# Patient Record
Sex: FEMALE | Race: WHITE
[De-identification: names, ages, dates, MRNs, and addresses within clinical notes are randomized per-mention and may not be internally consistent; named-entity substitution may affect disease eponyms.]

---

## 2024-05-31 ENCOUNTER — NON-APPOINTMENT (OUTPATIENT)
Age: 89
End: 2024-05-31

## 2024-06-14 ENCOUNTER — NON-APPOINTMENT (OUTPATIENT)
Age: 89
End: 2024-06-14

## 2024-06-14 ENCOUNTER — APPOINTMENT (OUTPATIENT)
Dept: ORTHOPEDIC SURGERY | Facility: CLINIC | Age: 89
End: 2024-06-14
Payer: MEDICARE

## 2024-06-14 VITALS — HEIGHT: 61 IN | BODY MASS INDEX: 29.07 KG/M2 | WEIGHT: 154 LBS

## 2024-06-14 DIAGNOSIS — Z86.39 PERSONAL HISTORY OF OTHER ENDOCRINE, NUTRITIONAL AND METABOLIC DISEASE: ICD-10-CM

## 2024-06-14 DIAGNOSIS — Z78.9 OTHER SPECIFIED HEALTH STATUS: ICD-10-CM

## 2024-06-14 DIAGNOSIS — M51.26 OTHER INTERVERTEBRAL DISC DISPLACEMENT, LUMBAR REGION: ICD-10-CM

## 2024-06-14 DIAGNOSIS — M43.16 SPONDYLOLISTHESIS, LUMBAR REGION: ICD-10-CM

## 2024-06-14 DIAGNOSIS — M54.50 LOW BACK PAIN, UNSPECIFIED: ICD-10-CM

## 2024-06-14 DIAGNOSIS — Z86.79 PERSONAL HISTORY OF OTHER DISEASES OF THE CIRCULATORY SYSTEM: ICD-10-CM

## 2024-06-14 PROBLEM — Z00.00 ENCOUNTER FOR PREVENTIVE HEALTH EXAMINATION: Status: ACTIVE | Noted: 2024-06-14

## 2024-06-14 PROCEDURE — 99204 OFFICE O/P NEW MOD 45 MIN: CPT

## 2024-06-14 PROCEDURE — 72100 X-RAY EXAM L-S SPINE 2/3 VWS: CPT

## 2024-06-14 NOTE — PHYSICAL EXAM
[Ataxic] : ataxic [Wheelchair] : uses a wheelchair [NL] : Motor strength of the left lower extremity was normal [Motor Strength Lower Extremities] : right (5/5) [] : Sensory: [L5-RT] : L5 [0] : left ankle jerk 0 [ALL] : dorsalis pedis, posterior tibial, femoral, popliteal, and radial 2+ and symmetric bilaterally [de-identified] : Lumbar ROM: Restricted TTP L spine and b/l paraspinals lumbar region Skin intact L spine. No rashes, ulcers, blisters.  No lymphedema.  Rapid alternating movements- intact Finger to nose testing- intact Full and non-painful ROM RUE, LUE, RLE and LLE. Skin intact RUE, LUE, RLE and LLE. No rashes, blisters, ulcers. NTTP RUE, LUE, RLE and LLE. No evidence of dislocation or subluxation B/L upper and lower extremities.  [de-identified] : 3 views AP and Lat, flex/ext of Lumbar Spine from [id number] . Read and dictated by Dr. Lonnie Villatoro Board Certified Orthopaedic surgeon 6/14/2024 L5S1 Spondylolisthesis 6/14/2024     MRI Lumbar 6/3/24 ([facility]) -    EMG 6/8/24 - L5-S1 radiculopathy on the right

## 2024-06-14 NOTE — HISTORY OF PRESENT ILLNESS
[de-identified] : 88 year old female presents for evaluation of lower back pain since 4/29/24. Has had history of lower back pain and scoliosis. History provided by patient's daughters as she has dementia. She was trying to get out of bed and felt the pain. She has had 2 falls since this incident. She had initially had pain radiating down the RLE to the foot, but now only has pain radiating from the knee to the foot. Can not specify if she has numbness/tingling. She drags her right leg with ambulation. Her PCP initially ordered a CTH to r/o stroke which was negative. She had seen her PCP and pain management physician Dr. Castro who prescribed her gabapentin which her daughters assume has been helpful. Had started PT this past week, has 2 sessions so far. She also had a venous duplex done that showed a possible left tibial artery occlusion. She also had an EMG done which revealed an L5-S1 radiculopathy. Denies JAZIEL. Has MRI Lumbar.

## 2024-06-14 NOTE — DISCUSSION/SUMMARY
[de-identified] : 89 yo female with L5S1 Spondylolisthesis , HNP Right side, with foot drop, discussed surgical and non surgical intervention, she will f/u with PMR, and AFO, RTO as needed.   Diagnosis, prognosis, natural history and treatment was discussed with patient. Patient was advised if the following symptoms develop: chills, fever,  loss of bladder control, bowel incontinence or urinary retention, numbness/tingling or weakness is present in upper or lower extremities, to go to the nearest emergency room. This may be a new clinical condition not present at the time of the patient visit  that may lead to paralysis and/or death, Patient advised if the above symptoms developed to also call the office immediately to inform us and to go to the nearest emergency room.

## 2024-07-15 ENCOUNTER — APPOINTMENT (OUTPATIENT)
Dept: PHYSICAL MEDICINE AND REHAB | Facility: CLINIC | Age: 89
End: 2024-07-15
Payer: MEDICARE

## 2024-07-15 DIAGNOSIS — M54.16 RADICULOPATHY, LUMBAR REGION: ICD-10-CM

## 2024-07-15 DIAGNOSIS — R26.9 UNSPECIFIED ABNORMALITIES OF GAIT AND MOBILITY: ICD-10-CM

## 2024-07-15 DIAGNOSIS — M21.371 FOOT DROP, RIGHT FOOT: ICD-10-CM

## 2024-07-15 PROCEDURE — 99204 OFFICE O/P NEW MOD 45 MIN: CPT

## 2024-07-15 RX ORDER — OLMESARTAN MEDOXOMIL 5 MG/1
5 TABLET, FILM COATED ORAL
Qty: 90 | Refills: 0 | Status: ACTIVE | COMMUNITY
Start: 2024-07-05

## 2024-07-15 RX ORDER — GABAPENTIN 300 MG/1
300 CAPSULE ORAL
Qty: 60 | Refills: 0 | Status: ACTIVE | COMMUNITY
Start: 2024-06-10

## 2024-07-15 RX ORDER — DAPAGLIFLOZIN 10 MG/1
10 TABLET, FILM COATED ORAL
Qty: 30 | Refills: 0 | Status: ACTIVE | COMMUNITY
Start: 2024-04-11

## 2025-04-29 ENCOUNTER — INPATIENT (INPATIENT)
Facility: HOSPITAL | Age: 89
LOS: 5 days | Discharge: ANOTHER HOSPITAL | DRG: 66 | End: 2025-05-05
Attending: STUDENT IN AN ORGANIZED HEALTH CARE EDUCATION/TRAINING PROGRAM | Admitting: STUDENT IN AN ORGANIZED HEALTH CARE EDUCATION/TRAINING PROGRAM
Payer: MEDICARE

## 2025-04-29 VITALS
HEART RATE: 70 BPM | OXYGEN SATURATION: 97 % | RESPIRATION RATE: 16 BRPM | DIASTOLIC BLOOD PRESSURE: 80 MMHG | TEMPERATURE: 98 F | SYSTOLIC BLOOD PRESSURE: 150 MMHG

## 2025-04-29 DIAGNOSIS — I63.9 CEREBRAL INFARCTION, UNSPECIFIED: ICD-10-CM

## 2025-04-29 LAB
ALBUMIN SERPL ELPH-MCNC: 2.9 G/DL — LOW (ref 3.5–5)
ALP SERPL-CCNC: 83 U/L — SIGNIFICANT CHANGE UP (ref 40–120)
ALT FLD-CCNC: 26 U/L DA — SIGNIFICANT CHANGE UP (ref 10–60)
ANION GAP SERPL CALC-SCNC: 6 MMOL/L — SIGNIFICANT CHANGE UP (ref 5–17)
APPEARANCE UR: CLEAR — SIGNIFICANT CHANGE UP
APTT BLD: 28.1 SEC — SIGNIFICANT CHANGE UP (ref 26.1–36.8)
AST SERPL-CCNC: 22 U/L — SIGNIFICANT CHANGE UP (ref 10–40)
BACTERIA # UR AUTO: ABNORMAL /HPF
BASOPHILS # BLD AUTO: 0.04 K/UL — SIGNIFICANT CHANGE UP (ref 0–0.2)
BASOPHILS NFR BLD AUTO: 0.4 % — SIGNIFICANT CHANGE UP (ref 0–2)
BILIRUB SERPL-MCNC: 0.3 MG/DL — SIGNIFICANT CHANGE UP (ref 0.2–1.2)
BILIRUB UR-MCNC: NEGATIVE — SIGNIFICANT CHANGE UP
BUN SERPL-MCNC: 56 MG/DL — HIGH (ref 7–18)
CALCIUM SERPL-MCNC: 9.1 MG/DL — SIGNIFICANT CHANGE UP (ref 8.4–10.5)
CHLORIDE SERPL-SCNC: 104 MMOL/L — SIGNIFICANT CHANGE UP (ref 96–108)
CHOLEST SERPL-MCNC: 139 MG/DL — SIGNIFICANT CHANGE UP
CK SERPL-CCNC: 131 U/L — SIGNIFICANT CHANGE UP (ref 21–215)
CO2 SERPL-SCNC: 25 MMOL/L — SIGNIFICANT CHANGE UP (ref 22–31)
COLOR SPEC: YELLOW — SIGNIFICANT CHANGE UP
CREAT SERPL-MCNC: 1.46 MG/DL — HIGH (ref 0.5–1.3)
DIFF PNL FLD: NEGATIVE — SIGNIFICANT CHANGE UP
EGFR: 35 ML/MIN/1.73M2 — LOW
EGFR: 35 ML/MIN/1.73M2 — LOW
EOSINOPHIL # BLD AUTO: 0.02 K/UL — SIGNIFICANT CHANGE UP (ref 0–0.5)
EOSINOPHIL NFR BLD AUTO: 0.2 % — SIGNIFICANT CHANGE UP (ref 0–6)
EPI CELLS # UR: PRESENT
GLUCOSE SERPL-MCNC: 131 MG/DL — HIGH (ref 70–99)
GLUCOSE UR QL: NEGATIVE MG/DL — SIGNIFICANT CHANGE UP
HCT VFR BLD CALC: 35.7 % — SIGNIFICANT CHANGE UP (ref 34.5–45)
HDLC SERPL-MCNC: 45 MG/DL — LOW
HGB BLD-MCNC: 11.9 G/DL — SIGNIFICANT CHANGE UP (ref 11.5–15.5)
IMM GRANULOCYTES NFR BLD AUTO: 0.4 % — SIGNIFICANT CHANGE UP (ref 0–0.9)
INR BLD: 1.02 RATIO — SIGNIFICANT CHANGE UP (ref 0.85–1.16)
KETONES UR-MCNC: NEGATIVE MG/DL — SIGNIFICANT CHANGE UP
LACTATE SERPL-SCNC: 2.1 MMOL/L — HIGH (ref 0.7–2)
LDLC SERPL-MCNC: 61 MG/DL — SIGNIFICANT CHANGE UP
LEUKOCYTE ESTERASE UR-ACNC: ABNORMAL
LIPID PNL WITH DIRECT LDL SERPL: 61 MG/DL — SIGNIFICANT CHANGE UP
LYMPHOCYTES # BLD AUTO: 1.84 K/UL — SIGNIFICANT CHANGE UP (ref 1–3.3)
LYMPHOCYTES # BLD AUTO: 19.5 % — SIGNIFICANT CHANGE UP (ref 13–44)
MAGNESIUM SERPL-MCNC: 1.9 MG/DL — SIGNIFICANT CHANGE UP (ref 1.6–2.6)
MCHC RBC-ENTMCNC: 30.6 PG — SIGNIFICANT CHANGE UP (ref 27–34)
MCHC RBC-ENTMCNC: 33.3 G/DL — SIGNIFICANT CHANGE UP (ref 32–36)
MCV RBC AUTO: 91.8 FL — SIGNIFICANT CHANGE UP (ref 80–100)
MONOCYTES # BLD AUTO: 1.07 K/UL — HIGH (ref 0–0.9)
MONOCYTES NFR BLD AUTO: 11.3 % — SIGNIFICANT CHANGE UP (ref 2–14)
NEUTROPHILS # BLD AUTO: 6.44 K/UL — SIGNIFICANT CHANGE UP (ref 1.8–7.4)
NEUTROPHILS NFR BLD AUTO: 68.2 % — SIGNIFICANT CHANGE UP (ref 43–77)
NITRITE UR-MCNC: NEGATIVE — SIGNIFICANT CHANGE UP
NONHDLC SERPL-MCNC: 94 MG/DL — SIGNIFICANT CHANGE UP
NRBC BLD AUTO-RTO: 0 /100 WBCS — SIGNIFICANT CHANGE UP (ref 0–0)
NT-PROBNP SERPL-SCNC: 36 PG/ML — SIGNIFICANT CHANGE UP (ref 0–450)
PH UR: 5 — SIGNIFICANT CHANGE UP (ref 5–8)
PLATELET # BLD AUTO: 157 K/UL — SIGNIFICANT CHANGE UP (ref 150–400)
POTASSIUM SERPL-MCNC: 5.1 MMOL/L — SIGNIFICANT CHANGE UP (ref 3.5–5.3)
POTASSIUM SERPL-SCNC: 5.1 MMOL/L — SIGNIFICANT CHANGE UP (ref 3.5–5.3)
PROT SERPL-MCNC: 6.4 G/DL — SIGNIFICANT CHANGE UP (ref 6–8.3)
PROT UR-MCNC: NEGATIVE MG/DL — SIGNIFICANT CHANGE UP
PROTHROM AB SERPL-ACNC: 11.8 SEC — SIGNIFICANT CHANGE UP (ref 9.9–13.4)
RBC # BLD: 3.89 M/UL — SIGNIFICANT CHANGE UP (ref 3.8–5.2)
RBC # FLD: 14 % — SIGNIFICANT CHANGE UP (ref 10.3–14.5)
RBC CASTS # UR COMP ASSIST: SIGNIFICANT CHANGE UP /HPF
SODIUM SERPL-SCNC: 135 MMOL/L — SIGNIFICANT CHANGE UP (ref 135–145)
SP GR SPEC: 1.03 — SIGNIFICANT CHANGE UP (ref 1–1.03)
TRIGL SERPL-MCNC: 200 MG/DL — HIGH
TROPONIN I, HIGH SENSITIVITY RESULT: 4.8 NG/L — SIGNIFICANT CHANGE UP
UROBILINOGEN FLD QL: 0.2 MG/DL — SIGNIFICANT CHANGE UP (ref 0.2–1)
WBC # BLD: 9.45 K/UL — SIGNIFICANT CHANGE UP (ref 3.8–10.5)
WBC # FLD AUTO: 9.45 K/UL — SIGNIFICANT CHANGE UP (ref 3.8–10.5)
WBC UR QL: 2 /HPF — SIGNIFICANT CHANGE UP (ref 0–5)

## 2025-04-29 PROCEDURE — 70496 CT ANGIOGRAPHY HEAD: CPT | Mod: 26

## 2025-04-29 PROCEDURE — 70450 CT HEAD/BRAIN W/O DYE: CPT | Mod: 26,XU

## 2025-04-29 PROCEDURE — 0042T: CPT

## 2025-04-29 PROCEDURE — 99223 1ST HOSP IP/OBS HIGH 75: CPT | Mod: GC

## 2025-04-29 PROCEDURE — 71045 X-RAY EXAM CHEST 1 VIEW: CPT | Mod: 26

## 2025-04-29 PROCEDURE — 70498 CT ANGIOGRAPHY NECK: CPT | Mod: 26

## 2025-04-29 PROCEDURE — 99291 CRITICAL CARE FIRST HOUR: CPT

## 2025-04-29 RX ORDER — ASPIRIN 325 MG
324 TABLET ORAL ONCE
Refills: 0 | Status: COMPLETED | OUTPATIENT
Start: 2025-04-29 | End: 2025-04-29

## 2025-04-29 RX ADMIN — Medication 324 MILLIGRAM(S): at 22:58

## 2025-04-29 NOTE — ED ADULT NURSE NOTE - OBJECTIVE STATEMENT
Patient brought in to ed with right sided weakness on both extremities, a head ache starting earlier today around 9, Last well known was at 11PM last night. Family noticed slight facial droop on the right side of the face.

## 2025-04-29 NOTE — H&P ADULT - ATTENDING COMMENTS
Chest X-Ray IMPRESSION: Left lower lobe infiltrate/atelectasis.    CTA Head/Neck, CT Perfusion IMPRESSION:  CT PERFUSION:  Technical limitations: Motion artifact  Core infarction: 0 ml  Penumbra / tissue at risk for active ischemia: 93 mL  CTA NECK:  Severe bilateral calcified plaque.  CTA HEAD:   The proximal left M1 segment is not well visualized and there is a tangle of small vessels in this region. Cannot exclude moyamoya syndrome. Distal vasculature appears fairly symmetrical compared to the right. This is likely chronic.    CT Head IMPRESSION: No acute intracranial hemorrhage, mass effect, or midline shift.    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  The patient got up today at 9am and was weak with back pain. She was prescribed Gabapentin in the past for back pain, and her doctor again prescribed it 100mg qAM and 300mg qHS. She took yesterday her first 2 doses and in the morning when she was weak she got her morning dose of Gabapentin for the back pain. She was so weak she could not  her fork to eat and had to be fed. The weakness is bilateral but worse on the right, and she could not  her fork even with her left hand. She had some burping followed by 1 episode of nbnb vomiting after getting her morning Gabapentin. She was also so weak when using the toilet she slid from the toilet to the floor due to her weakness. Her symptoms worsened throughout the day and at 5pm developed slurred speech, so the family called EMS. Patient also has Parkinson's tremors that have worsened for the past few months. Daughter said her PCP doctor did not want to start Parkinson's medications for her; but, she does not follow a Neurologist.  Review Of Systems included: + right hand/arm weakness, + slurred speech, + right leg weakness, + generalized weakness, + back pain, + headache, + abdominal pain, + vomiting x1, no fever, no chills, no shortness of breath, no cough    Daughter at bedside translated as per patient's preference  General: Awake, Alert, Oriented x1-2  Cardiac: RRR  Pulmonary: CTA b/l  Abdominal: Soft, ND, NT  Neuro: CN II-XII intact, Muscle Strength +4/5 in LUE, +2/5 in RUE, +3/5 in LLE, +1/5 in RLE, Sensation intact, + Tremor  Extremities: No edema b/l    # Stroke  # Severe Right Sided Weakness  # Ambulatory Dysfunction  # Severe bilateral calcified plaque on CT  > NIHSS of 14  > ER Discussed with Tele-Stroke and Endovascular Specialist. Patient is not a tPA candidate. As per endovascular specialist - no  neurointervention due to poor baseline, 23 hours LKN, and no clear LVO on imaging. Transfer not indicated.  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 DASH Diet  - Monitor and replete electrolytes as needed  - Fall, Aspiration Precautions    Previous Admissions Included  7/15/2024: PM&R Ambulatory Note: Lumbar radiculopathy, Right foot drop. Flare of right-sided sciatica/weakness in April, 2024. Prescription provided for a right custom molded SAFO (set in 1 to 2 degrees of plantarflexion), instep strap, full footplate, flexible toe. Prescription provided for outpatient physical therapy emphasizing gait training/gait mechanics with her new AFO.  6/14/2024: Ortho Clinic Visit: Lower back pain. EMG done revealed an L5-S1 radiculopathy. She also had a venous duplex done that showed a possible left tibial artery occlusion. She had seen her PCP and pain management physician Dr. Castro who prescribed her gabapentin which her daughters assume has been helpful. Has MRI Lumbar. Assessment: Lumbar herniated disc. Spondylolisthesis of lumbar region. Plan: Physiatry Referral, Orthopaedic Brace  10/3/2023: [facility] Progress Note: Primary hypertension: Controlled. Leg swelling: Venous insufficiency. Normal LVEF, no CHF. Hypercholesterolemia: LDL at goal    Patient case and management was discussed with ER Attending  I did examine all labs (including CBC, PT/INR, APTT, CMP, Lactate, Lipid Panel, Creatine Kinase, pro-BNP, UA), imaging, prior notes Chest X-Ray IMPRESSION: Left lower lobe infiltrate/atelectasis.    CTA Head/Neck, CT Perfusion IMPRESSION:  CT PERFUSION: Technical limitations: Motion artifact. Core infarction: 0 ml. Penumbra / tissue at risk for active ischemia: 93 mL  CTA NECK: Severe bilateral calcified plaque.  CTA HEAD: The proximal left M1 segment is not well visualized and there is a tangle of small vessels in this region. Cannot exclude moyamoya syndrome. Distal vasculature appears fairly symmetrical compared to the right. This is likely chronic.    CT Head IMPRESSION: No acute intracranial hemorrhage, mass effect, or midline shift.    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  The patient got up today at 9am and was weak with back pain. She was prescribed Gabapentin in the past for back pain, and her doctor again prescribed it 100mg qAM and 300mg qHS. She took yesterday her first 2 doses and in the morning when she was weak she got her morning dose of Gabapentin for the back pain. She was so weak she could not  her fork to eat and had to be fed. The weakness is bilateral but worse on the right, and she could not  her fork even with her left hand. She had some burping followed by 1 episode of nbnb vomiting after getting her morning Gabapentin. She was also so weak when using the toilet she slid from the toilet to the floor due to her weakness. Her symptoms worsened throughout the day and at 5pm developed slurred speech, so the family called EMS. Patient also has Parkinson's tremors that have worsened for the past few months. Daughter said her PCP doctor did not want to start Parkinson's medications for her; but, she does not follow a Neurologist.  Review Of Systems included: + right hand/arm weakness, + slurred speech, + right leg weakness, + generalized weakness, + back pain, + headache, + abdominal pain, + vomiting x1, no fever, no chills, no shortness of breath, no cough    Daughter at bedside translated as per patient's preference  General: Awake, Alert, Oriented x1-2  Cardiac: RRR  Pulmonary: CTA b/l  Abdominal: Soft, ND, NT  Neuro: CN II-XII intact, Muscle Strength +4/5 in LUE, +2/5 in RUE, +3/5 in LLE, +1/5 in RLE, Sensation intact, + Tremor  Extremities: No edema b/l    # Stroke  # Severe Right Sided Weakness  # Ambulatory Dysfunction  # Severe bilateral calcified plaque on CT  > NIHSS of 14  > ER Discussed with Tele-Stroke and Endovascular Specialist. Patient is not a tPA candidate. As per endovascular specialist - no  neurointervention due to poor baseline, 23 hours LKN, and no clear LVO on imaging. Transfer not indicated.  - Consult Neurology  - Can Consult Vascular  - MRI Brain  - Cardiac Telemetry Monitoring  - ECHO  - Lipid Panel  - A1C  - Aspirin 81mg daily  - Plavix not indicated due to NIHSS being too high at 14  - Atorvastatin 40mg daily  - Neuro-checks  - Permissive Hypertension for 24-48 hours  - PT Evaluation    # Left lower lobe infiltrate/atelectasis on CXR  > Patient asymptomatic with no shortness of breath or cough. Afebrile. No leukocytosis. Likely Atelectasis. Infection not suspected. 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 DASH Diet  - Monitor and replete electrolytes as needed  - Fall, Aspiration Precautions    Previous Admissions Included  7/15/2024: PM&R Ambulatory Note: Lumbar radiculopathy, Right foot drop. Flare of right-sided sciatica/weakness in April, 2024. Prescription provided for a right custom molded SAFO (set in 1 to 2 degrees of plantarflexion), instep strap, full footplate, flexible toe. Prescription provided for outpatient physical therapy emphasizing gait training/gait mechanics with her new AFO.  6/14/2024: Ortho Clinic Visit: Lower back pain. EMG done revealed an L5-S1 radiculopathy. She also had a venous duplex done that showed a possible left tibial artery occlusion. She had seen her PCP and pain management physician Dr. Castro who prescribed her gabapentin which her daughters assume has been helpful. Has MRI Lumbar. Assessment: Lumbar herniated disc. Spondylolisthesis of lumbar region. Plan: Physiatry Referral, Orthopaedic Brace  10/3/2023: [facility] Progress Note: Primary hypertension: Controlled. Leg swelling: Venous insufficiency. Normal LVEF, no CHF. Hypercholesterolemia: LDL at goal    Patient case and management was discussed with ER Attending  I did examine all labs (including CBC, PT/INR, APTT, CMP, Lactate, Lipid Panel, Creatine Kinase, pro-BNP, UA), imaging, prior notes

## 2025-04-29 NOTE — H&P ADULT - PROBLEM SELECTOR PLAN 4
Hx of Alport syndrome   Patient is a carrier  of the gene and has difficulty hearing   Most of the patient family have kidney disease Hx of Parkinson disease not on med  Per daughter, patient primary care physician told her not to start any medication.   Per daughter patient has not seen a neurologist   PE: resting tremor noted   - consult Neuro in the AM

## 2025-04-29 NOTE — H&P ADULT - PROBLEM SELECTOR PLAN 3
Patient ambulates with a walker at baseline  Hx of compression fracture in the lumbar region with foot drop   Since yesterday patient has been having difficulty with ambulation  - PT consulted Hx of Alport syndrome   Patient is a carrier  of the gene and has difficulty hearing   Most of the patient family have kidney disease

## 2025-04-29 NOTE — H&P ADULT - PROBLEM SELECTOR PLAN 5
Hx of Parkinson disease not on med  Per daughter, patient primary care physician told her not to start any medication.   Per daughter patient has not seen a neurologist   PE: resting tremor noted   - consult Neuro in the AM Hx of Compression fracture L1, L4-5 with foot drop,   currently on Gabapentin 100mg QD, Gabapentin 300mg QHS   - Hold med for now   - Start Tylenol and lidocaine patch  for pain

## 2025-04-29 NOTE — H&P ADULT - ASSESSMENT
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 admitted to telemetry for CVA, ambulatory dysfunction

## 2025-04-29 NOTE — H&P ADULT - PROBLEM SELECTOR PLAN 6
Hx of Compression fracture L1, L4-5 with foot drop,   currently on Gabapentin 100mg QD, Gabapentin 300mg QHS   - Hold med for now   - Start Tylenol and lidocaine patch  for pain p/w BUN/ SCr 56/1.40  on admission  Baseline SCr  1.21 as per outpatient record (4/23/25)  - Avoid Nephrotoxic agent.

## 2025-04-29 NOTE — H&P ADULT - NSHPREVIEWOFSYSTEMS_GEN_ALL_CORE
REVIEW OF SYSTEMS:  CONSTITUTIONAL: No fevers or chills  EYES: No conjunctiva redness, No eye discharge  ENT: No nasal congestion, No sore throat, No ear pain,   NECK: No pain or stiffness  RESPIRATORY: No cough, SOB,  wheezing, hemoptysis  CARDIOVASCULAR: No chest pain or  palpitations  GASTROINTESTINAL: + Nausea, vomting, No abdominal pain, diarrhea or constipation.  GENITOURINARY: No dysuria, frequency or hematuria  NEUROLOGICAL: Right sided weakness, No headache,  SKIN: No itching, rashes,   All other review of systems is negative unless indicated above.

## 2025-04-29 NOTE — ED PROVIDER NOTE - NIH STROKE SCALE: 3. VISUAL, QM
Topical Retinoids Recommendations: OTC Differin gel Isotretinoin Pregnancy And Lactation Text: This medication is Pregnancy Category X and is considered extremely dangerous during pregnancy. It is unknown if it is excreted in breast milk. Birth Control Pills Counseling: Birth Control Pill Counseling: I discussed with the patient the potential side effects of OCPs including but not limited to increased risk of stroke, heart attack, thrombophlebitis, deep venous thrombosis, hepatic adenomas, breast changes, GI upset, headaches, and depression.  The patient verbalized understanding of the proper use and possible adverse effects of OCPs. All of the patient's questions and concerns were addressed. Topical Sulfur Applications Counseling: Topical Sulfur Counseling: Patient counseled that this medication may cause skin irritation or allergic reactions.  In the event of skin irritation, the patient was advised to reduce the amount of the drug applied or use it less frequently.   The patient verbalized understanding of the proper use and possible adverse effects of topical sulfur application.  All of the patient's questions and concerns were addressed. Azithromycin Counseling:  I discussed with the patient the risks of azithromycin including but not limited to GI upset, allergic reaction, drug rash, diarrhea, and yeast infections. Bpo Recommendations: Panoxyl creamy wash Azelaic Acid Pregnancy And Lactation Text: This medication is considered safe during pregnancy and breast feeding. Sarecycline Counseling: Patient advised regarding possible photosensitivity and discoloration of the teeth, skin, lips, tongue and gums.  Patient instructed to avoid sunlight, if possible.  When exposed to sunlight, patients should wear protective clothing, sunglasses, and sunscreen.  The patient was instructed to call the office immediately if the following severe adverse effects occur:  hearing changes, easy bruising/bleeding, severe headache, or vision changes.  The patient verbalized understanding of the proper use and possible adverse effects of sarecycline.  All of the patient's questions and concerns were addressed. Tazorac Counseling:  Patient advised that medication is irritating and drying.  Patient may need to apply sparingly and wash off after an hour before eventually leaving it on overnight.  The patient verbalized understanding of the proper use and possible adverse effects of tazorac.  All of the patient's questions and concerns were addressed. Tetracycline Pregnancy And Lactation Text: This medication is Pregnancy Category D and not consider safe during pregnancy. It is also excreted in breast milk. Doxycycline Pregnancy And Lactation Text: This medication is Pregnancy Category D and not consider safe during pregnancy. It is also excreted in breast milk but is considered safe for shorter treatment courses. Detail Level: Simple High Dose Vitamin A Counseling: Side effects reviewed, pt to contact office should one occur. Topical Sulfur Applications Pregnancy And Lactation Text: This medication is Pregnancy Category C and has an unknown safety profile during pregnancy. It is unknown if this topical medication is excreted in breast milk. Birth Control Pills Pregnancy And Lactation Text: This medication should be avoided if pregnant and for the first 30 days post-partum. Use Enhanced Medication Counseling?: No Benzoyl Peroxide Counseling: Patient counseled that medicine may cause skin irritation and bleach clothing.  In the event of skin irritation, the patient was advised to reduce the amount of the drug applied or use it less frequently.   The patient verbalized understanding of the proper use and possible adverse effects of benzoyl peroxide.  All of the patient's questions and concerns were addressed. Erythromycin Counseling:  I discussed with the patient the risks of erythromycin including but not limited to GI upset, allergic reaction, drug rash, diarrhea, increase in liver enzymes, and yeast infections. Cleanser Recommendations: Cerave or Cetqphil Aklief counseling:  Patient advised to apply a pea-sized amount only at bedtime and wait 30 minutes after washing their face before applying.  If too drying, patient may add a non-comedogenic moisturizer.  The most commonly reported side effects including irritation, redness, scaling, dryness, stinging, burning, itching, and increased risk of sunburn.  The patient verbalized understanding of the proper use and possible adverse effects of retinoids.  All of the patient's questions and concerns were addressed. High Dose Vitamin A Pregnancy And Lactation Text: High dose vitamin A therapy is contraindicated during pregnancy and breast feeding. Azithromycin Pregnancy And Lactation Text: This medication is considered safe during pregnancy and is also secreted in breast milk. Tazorac Pregnancy And Lactation Text: This medication is not safe during pregnancy. It is unknown if this medication is excreted in breast milk. Dapsone Counseling: I discussed with the patient the risks of dapsone including but not limited to hemolytic anemia, agranulocytosis, rashes, methemoglobinemia, kidney failure, peripheral neuropathy, headaches, GI upset, and liver toxicity.  Patients who start dapsone require monitoring including baseline LFTs and weekly CBCs for the first month, then every month thereafter.  The patient verbalized understanding of the proper use and possible adverse effects of dapsone.  All of the patient's questions and concerns were addressed. Winlevi Counseling:  I discussed with the patient the risks of topical clascoterone including but not limited to erythema, scaling, itching, and stinging. Patient voiced their understanding. Benzoyl Peroxide Pregnancy And Lactation Text: This medication is Pregnancy Category C. It is unknown if benzoyl peroxide is excreted in breast milk. Spironolactone Counseling: Patient advised regarding risks of diarrhea, abdominal pain, hyperkalemia, birth defects (for female patients), liver toxicity and renal toxicity. The patient may need blood work to monitor liver and kidney function and potassium levels while on therapy. The patient verbalized understanding of the proper use and possible adverse effects of spironolactone.  All of the patient's questions and concerns were addressed. Erythromycin Pregnancy And Lactation Text: This medication is Pregnancy Category B and is considered safe during pregnancy. It is also excreted in breast milk. Moisturizer Recommendations: Cetaphil Oil Control for morning, Cerave PM for evening Topical Clindamycin Counseling: Patient counseled that this medication may cause skin irritation or allergic reactions.  In the event of skin irritation, the patient was advised to reduce the amount of the drug applied or use it less frequently.   The patient verbalized understanding of the proper use and possible adverse effects of clindamycin.  All of the patient's questions and concerns were addressed. Bactrim Counseling:  I discussed with the patient the risks of sulfa antibiotics including but not limited to GI upset, allergic reaction, drug rash, diarrhea, dizziness, photosensitivity, and yeast infections.  Rarely, more serious reactions can occur including but not limited to aplastic anemia, agranulocytosis, methemoglobinemia, blood dyscrasias, liver or kidney failure, lung infiltrates or desquamative/blistering drug rashes. Minocycline Counseling: Patient advised regarding possible photosensitivity and discoloration of the teeth, skin, lips, tongue and gums.  Patient instructed to avoid sunlight, if possible.  When exposed to sunlight, patients should wear protective clothing, sunglasses, and sunscreen.  The patient was instructed to call the office immediately if the following severe adverse effects occur:  hearing changes, easy bruising/bleeding, severe headache, or vision changes.  The patient verbalized understanding of the proper use and possible adverse effects of minocycline.  All of the patient's questions and concerns were addressed. Dapsone Pregnancy And Lactation Text: This medication is Pregnancy Category C and is not considered safe during pregnancy or breast feeding. Winlevi Pregnancy And Lactation Text: This medication is considered safe during pregnancy and breastfeeding. Aklief Pregnancy And Lactation Text: It is unknown if this medication is safe to use during pregnancy.  It is unknown if this medication is excreted in breast milk.  Breastfeeding women should use the topical cream on the smallest area of the skin for the shortest time needed while breastfeeding.  Do not apply to nipple and areola. Topical Retinoid counseling:  Patient advised to apply a pea-sized amount only at bedtime and wait 30 minutes after washing their face before applying.  If too drying, patient may add a non-comedogenic moisturizer. The patient verbalized understanding of the proper use and possible adverse effects of retinoids.  All of the patient's questions and concerns were addressed. Spironolactone Pregnancy And Lactation Text: This medication can cause feminization of the male fetus and should be avoided during pregnancy. The active metabolite is also found in breast milk. Isotretinoin Counseling: Patient should get monthly blood tests, not donate blood, not drive at night if vision affected, not share medication, and not undergo elective surgery for 6 months after tx completed. Side effects reviewed, pt to contact office should one occur. Bactrim Pregnancy And Lactation Text: This medication is Pregnancy Category D and is known to cause fetal risk.  It is also excreted in breast milk. Sunscreen Recommendations: SPF 30 or higher Doxycycline Counseling:  Patient counseled regarding possible photosensitivity and increased risk for sunburn.  Patient instructed to avoid sunlight, if possible.  When exposed to sunlight, patients should wear protective clothing, sunglasses, and sunscreen.  The patient was instructed to call the office immediately if the following severe adverse effects occur:  hearing changes, easy bruising/bleeding, severe headache, or vision changes.  The patient verbalized understanding of the proper use and possible adverse effects of doxycycline.  All of the patient's questions and concerns were addressed. Topical Clindamycin Pregnancy And Lactation Text: This medication is Pregnancy Category B and is considered safe during pregnancy. It is unknown if it is excreted in breast milk. Azelaic Acid Counseling: Patient counseled that medicine may cause skin irritation and to avoid applying near the eyes.  In the event of skin irritation, the patient was advised to reduce the amount of the drug applied or use it less frequently.   The patient verbalized understanding of the proper use and possible adverse effects of azelaic acid.  All of the patient's questions and concerns were addressed. Topical Retinoid Pregnancy And Lactation Text: This medication is Pregnancy Category C. It is unknown if this medication is excreted in breast milk. (0) No visual loss Tetracycline Counseling: Patient counseled regarding possible photosensitivity and increased risk for sunburn.  Patient instructed to avoid sunlight, if possible.  When exposed to sunlight, patients should wear protective clothing, sunglasses, and sunscreen.  The patient was instructed to call the office immediately if the following severe adverse effects occur:  hearing changes, easy bruising/bleeding, severe headache, or vision changes.  The patient verbalized understanding of the proper use and possible adverse effects of tetracycline.  All of the patient's questions and concerns were addressed. Patient understands to avoid pregnancy while on therapy due to potential birth defects.

## 2025-04-29 NOTE — H&P ADULT - NSICDXFAMILYHX_GEN_ALL_CORE_FT
FAMILY HISTORY:  Mother  Still living? Unknown  Family history of deafness, Age at diagnosis: Age Unknown    Child  Still living? Unknown  Family history of deafness, Age at diagnosis: Age Unknown    Grandparent  Still living? Unknown  Family history of deafness, Age at diagnosis: Age Unknown    Aunt  Still living? Unknown  Family history of deafness, Age at diagnosis: Age Unknown

## 2025-04-29 NOTE — H&P ADULT - NSICDXPASTMEDICALHX_GEN_ALL_CORE_FT
PAST MEDICAL HISTORY:  Alport syndrome     Dementia     H/O Parkinson's disease     History of scoliosis     HLD (hyperlipidemia)     HTN (hypertension)     Lumbar compression fracture

## 2025-04-29 NOTE — ED PROVIDER NOTE - ENMT, MLM
Airway patent, Nasal mucosa clear. Mouth with normal mucosa. Throat has no vesicles, no oropharyngeal exudates and uvula is midline.   tongue is midline,  hearing loss

## 2025-04-29 NOTE — ED PROVIDER NOTE - CLINICAL SUMMARY MEDICAL DECISION MAKING FREE TEXT BOX
89-year-old female with history of Alport disease, Dementia, HTN, HLD, lumbar compression fracture complaining of bone pain all over for past few days, worse yesterday.  She took gabapentin 100 mg yesterday day, 300 mg last night.  Patient woke up this morning with right-sided weakness, lethargic, headache. last known well-unknown, stroke code activated.  Patient is not a tPA candidate, possible endovascular intervention candidate.    21:50  Labs/CT result explained to patient's daughter   CT head Core infarction: 0 ml  Penumbra / tissue at risk for active ischemia: 93 mL    CTA NECK:  Severe bilateral calcified plaque.    CTA HEAD:  The proximal left M1 segment is not well visualized and there is a tangle   of small vessels in this region. Cannot exclude moyamoya syndrome. Distal   vasculature appears fairly symmetrical compared to the right. This is   likely chronic.   20:03Case discussed with telestroke attending Dr. Schultz, Who also discussed case with endovascular specialist 89-year-old female with history of Alport disease, Dementia, HTN, HLD, lumbar compression fracture complaining of bone pain all over for past few days, worse yesterday.  She took gabapentin 100 mg yesterday day, 300 mg last night.  Patient woke up this morning with right-sided weakness, lethargic, headache. last known well-unknown, stroke code activated.  Patient is not a tPA candidate, possible endovascular intervention candidate.    21:50  Labs/CT result explained to patient's daughter   CT head Core infarction: 0 ml  Penumbra / tissue at risk for active ischemia: 93 mL    CTA NECK:  Severe bilateral calcified plaque.    CTA HEAD:  The proximal left M1 segment is not well visualized and there is a tangle   of small vessels in this region. Cannot exclude moyamoya syndrome. Distal   vasculature appears fairly symmetrical compared to the right. This is   likely chronic.   20:03Case discussed with telestroke attending Dr. Schultz, Who also discussed case with endovascular specialist, Recommendation pending  20:25  as per endovascular specialist-no  neurointervention due to poor baseline, 23 hours LKN, and no clear LVO on imaging   will admit patient  22:31  Case discussed with  attending

## 2025-04-29 NOTE — H&P ADULT - PROBLEM SELECTOR PLAN 1
BIBEMS for right sided weakness and altered mental status  last known normal was yesterday and 11pm  NIHSS 14  in th ED   Labs: Lact 2.1 >1.3,   UA negative   CXR: Lower lobe infiltrate/atelectasis   Stroke protocol: Motion artifact, Penumbra / tissue at risk for active ischemia, Severe bilateral calcified plaque in the neck. The proximal left M1 segment is not well visualized and there is a tangle of small vessels in this region.   Dysphagia screen: Passed   Tele stroke consulted by the ED  > Patient is out of window for TNK w/ LKW 11pm last night  > Deemed not a candidate for mechanical thrombectomy in setting of negative CTA w/ no clear acute LVO on imaging, age , poor baseline function and LKW >23hours ago.  - Started Aspirin 81mg, Atorvastatin 40mg  QD,   - Admit to Telemetry  - Neuro checks q4  - Monitor BP - allow permissive htn x24hr   - PT consulted  - Consult Neuro in the AM  - F/u MR Brain non cont

## 2025-04-29 NOTE — H&P ADULT - HISTORY OF PRESENT ILLNESS
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 Collateral information obtained from daughter at bedside, As per daughter, patient  was complaining of lower back pain yesterday. Patient was prescribed Gabapentin ( 100mg in the morning  and 300mg at night) by PCP. Patient received a day dose of the medication. Per daughter, patient went to sleep at approximately 11 PM last night and got up today at approximately  9 AM.  Per daughter, patient started complaining of severe back pain at 10:30am,  so she gave patient Gabapentin 100mg. Few minutes later patient felt nauseous and vomited ( 1 episode). At  approximately 12pm, patient appeared to be very lethargic and developed right developed right sided weakness. Per daughter, patient was unable to get up to use the restrooom so her sister picked patient up to use the rest room. Per daugter, patient slid forward  from the toilet to the floor. Per daughter, there was no head trauma or LOC. Per daughter, they were able to carry patient back to the bedroom. At approximately 5pm, they noticed that the  patient appeared more lethargic, confused and recently developed slurred speech. So they decide to call 911 and EMS brought patient to the hospital for further management. 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 Collateral information obtained from daughter at bedside, As per daughter, patient  was complaining of lower back pain yesterday. Patient was prescribed Gabapentin ( 100mg in the morning  and 300mg at night) by PCP. Patient received a day dose of the medication. Per daughter, patient went to sleep at approximately 11 PM last night and got up today at approximately  9 AM.  Per daughter, patient  appeared very weak in the morning and started complaining of severe back pain.  So she gave the  patient Gabapentin 100mg. Few minutes later patient felt nauseous and vomited ( 1 episode). At  approximately 12pm, patient appeared to be very lethargic and developed right developed right sided weakness. Per daughter, patient was unable to get up to use the restrooom so her sister picked patient up to use the rest room. Per daugter, patient slid forward  from the toilet to the floor. Per daughter, there was no head trauma or LOC. Per daughter, they were able to carry patient back to the bedroom. At approximately 5pm, they noticed that the  patient appeared more lethargic, confused and recently developed slurred speech. So they decide to call 911 and EMS brought patient to the hospital for further management. Per daughter, there have  been no fever, complaint of urinary symptoms, chest pain, SOB, cough, abdominal pain

## 2025-04-29 NOTE — H&P ADULT - PROBLEM SELECTOR PLAN 2
p/w BUN/ SCr 56/1.40  on admission  Baseline SCr  1.21 as per outpatient record (4/23/25)  - c/w gentle fluids   - f/u BMP  - Avoid Nephrotoxic agent. Patient ambulates with a walker at baseline  Hx of compression fracture in the lumbar region with foot drop   Since yesterday patient has been having difficulty with ambulation  - PT consulted

## 2025-04-29 NOTE — ED ADULT TRIAGE NOTE - NS_BHTRGSOURCE_ED_A_ED_FT
Alert-The patient is alert, awake and responds to voice. The patient is oriented to time, place, and person. The triage nurse is able to obtain subjective information. Candida Corado

## 2025-04-29 NOTE — ED ADULT TRIAGE NOTE - CHIEF COMPLAINT QUOTE
BIBA from home as per daughter right arm weakness and slurred speech since 12 noon BIBA from home as per daughter right arm weakness and slurred speech since 9am but getting worse during the day  Hx Dementia

## 2025-04-29 NOTE — H&P ADULT - NSHPLANGLIMITEDENGLISH_GEN_A_CORE
PATIENT UP WITH PHYSICAL THERAPY, ASSITED PATIENT TO RESTROOM AND PATIENT HAD
ONE FORMED BM, PHYSICAL THERAPIST ASSITED PATIENT TO CHAIR USING WALKER AND
HAD SLOW GAIT, PATINET THEN C/O ACHING PAIN TO LEFT KNEE, NORCO 5/325 TAB PO
Q4H PRN GIVEN FOR PAIN, NO DISTRSS NOTED, RESPIRATIONS EVEN AND UNLABORED,
CALL LIGHT AND BELONGINGS WITHIN REACH, AND WILL CONTINUE TO MONITOR. Yes

## 2025-04-29 NOTE — H&P ADULT - NSHPPHYSICALEXAM_GEN_ALL_CORE
T(F): 97.4 (30 Apr 2025 01:30), Max: 97.9 (29 Apr 2025 20:00)  HR: 76 (30 Apr 2025 01:30)  BP: 152/84 (30 Apr 2025 01:30)  RR: 18 (30 Apr 2025 01:30)  SpO2: 99% (30 Apr 2025 01:30) (96% - 99%)  temp max in last 48H T(F): , Max: 97.9 (04-29-25 @ 20:00)        GENERAL: NAD, lying in bed comfortably   HEAD:  Atraumatic, Normocephalic  EYES: clear conjunctiva and  sclera   ENT: Moist mucous membranes  NECK: Supple  LUNG: Clear to auscultation bilaterally. No rales, wheezing,   HEART: Regular rate and rhythm; No murmurs,  CHEST WALL: non tenderness    ABDOMEN: Bowel sounds present; Soft, Nontender, Nondistended,   EXTREMITIES:  2+ Peripheral Pulses, . No clubbing, cyanosis, or edema  NERVOUS SYSTEM:  AAOX 1-2, speech clear. No deficits   SKIN: No rashes or lesions T(F): 97.4 (30 Apr 2025 01:30), Max: 97.9 (29 Apr 2025 20:00)  HR: 76 (30 Apr 2025 01:30)  BP: 152/84 (30 Apr 2025 01:30)  RR: 18 (30 Apr 2025 01:30)  SpO2: 99% (30 Apr 2025 01:30) (96% - 99%)  temp max in last 48H T(F): , Max: 97.9 (04-29-25 @ 20:00)        GENERAL: NAD, lying in bed comfortably   HEAD:  Atraumatic, Normocephalic  EYES: clear conjunctiva and  sclera   ENT: Moist mucous membranes  NECK: Supple  LUNG: Clear to auscultation bilaterally. No rales, wheezing,   HEART: Regular rate and rhythm; No murmurs,  CHEST WALL: non tenderness    ABDOMEN: Bowel sounds present; Soft, Nontender, Nondistended,   EXTREMITIES:  2+ Peripheral Pulses, . No clubbing, cyanosis, or edema  NERVOUS SYSTEM:  AAOX 1-2, speech clear.  2/5 strength in RUE, 1/5 strength in the RLE,   SKIN: No rashes or lesions T(F): 97.4 (30 Apr 2025 01:30), Max: 97.9 (29 Apr 2025 20:00)  HR: 76 (30 Apr 2025 01:30)  BP: 152/84 (30 Apr 2025 01:30)  RR: 18 (30 Apr 2025 01:30)  SpO2: 99% (30 Apr 2025 01:30) (96% - 99%)  temp max in last 48H T(F): , Max: 97.9 (04-29-25 @ 20:00)        GENERAL: NAD, lying in bed comfortably   HEAD:  Atraumatic, Normocephalic  EYES: clear conjunctiva and  sclera   ENT: Moist mucous membranes  NECK: Supple  LUNG: Clear to auscultation bilaterally. No rales, wheezing,   HEART: Regular rate and rhythm; No murmurs,  CHEST WALL: non tenderness    ABDOMEN: Bowel sounds present; Soft, Nontender, Nondistended,   EXTREMITIES:  2+ Peripheral Pulses, . No clubbing, cyanosis, or edema  NERVOUS SYSTEM:  AAOX 1-2, speech clear.  2/5 strength in RUE, 1/5 strength in the RLE, 4/5 strength in the LUE, 2/5 strenght in LLE, Resting tremors noted. Limited further exam ,   SKIN: No rashes or lesions

## 2025-04-29 NOTE — ED ADULT NURSE NOTE - CHIEF COMPLAINT QUOTE
BIBA from home as per daughter right arm weakness and slurred speech since 9am but getting worse during the day  Hx Dementia

## 2025-04-30 ENCOUNTER — RESULT REVIEW (OUTPATIENT)
Age: 89
End: 2025-04-30

## 2025-04-30 DIAGNOSIS — R26.2 DIFFICULTY IN WALKING, NOT ELSEWHERE CLASSIFIED: ICD-10-CM

## 2025-04-30 DIAGNOSIS — N17.9 ACUTE KIDNEY FAILURE, UNSPECIFIED: ICD-10-CM

## 2025-04-30 DIAGNOSIS — I10 ESSENTIAL (PRIMARY) HYPERTENSION: ICD-10-CM

## 2025-04-30 DIAGNOSIS — Z87.81 PERSONAL HISTORY OF (HEALED) TRAUMATIC FRACTURE: ICD-10-CM

## 2025-04-30 DIAGNOSIS — Z98.890 OTHER SPECIFIED POSTPROCEDURAL STATES: Chronic | ICD-10-CM

## 2025-04-30 DIAGNOSIS — Z86.69 PERSONAL HISTORY OF OTHER DISEASES OF THE NERVOUS SYSTEM AND SENSE ORGANS: ICD-10-CM

## 2025-04-30 DIAGNOSIS — N18.9 CHRONIC KIDNEY DISEASE, UNSPECIFIED: ICD-10-CM

## 2025-04-30 DIAGNOSIS — R09.89 OTHER SPECIFIED SYMPTOMS AND SIGNS INVOLVING THE CIRCULATORY AND RESPIRATORY SYSTEMS: ICD-10-CM

## 2025-04-30 DIAGNOSIS — Q87.81 ALPORT SYNDROME: ICD-10-CM

## 2025-04-30 DIAGNOSIS — Z29.9 ENCOUNTER FOR PROPHYLACTIC MEASURES, UNSPECIFIED: ICD-10-CM

## 2025-04-30 DIAGNOSIS — E78.5 HYPERLIPIDEMIA, UNSPECIFIED: ICD-10-CM

## 2025-04-30 DIAGNOSIS — I63.9 CEREBRAL INFARCTION, UNSPECIFIED: ICD-10-CM

## 2025-04-30 LAB
A1C WITH ESTIMATED AVERAGE GLUCOSE RESULT: 6.4 % — HIGH (ref 4–5.6)
ALBUMIN SERPL ELPH-MCNC: 3.4 G/DL — LOW (ref 3.5–5)
ALP SERPL-CCNC: 86 U/L — SIGNIFICANT CHANGE UP (ref 40–120)
ALT FLD-CCNC: 27 U/L DA — SIGNIFICANT CHANGE UP (ref 10–60)
ANION GAP SERPL CALC-SCNC: 9 MMOL/L — SIGNIFICANT CHANGE UP (ref 5–17)
AST SERPL-CCNC: 18 U/L — SIGNIFICANT CHANGE UP (ref 10–40)
BILIRUB SERPL-MCNC: 0.3 MG/DL — SIGNIFICANT CHANGE UP (ref 0.2–1.2)
BUN SERPL-MCNC: 59 MG/DL — HIGH (ref 7–18)
CALCIUM SERPL-MCNC: 9.4 MG/DL — SIGNIFICANT CHANGE UP (ref 8.4–10.5)
CHLORIDE SERPL-SCNC: 104 MMOL/L — SIGNIFICANT CHANGE UP (ref 96–108)
CO2 SERPL-SCNC: 22 MMOL/L — SIGNIFICANT CHANGE UP (ref 22–31)
CREAT SERPL-MCNC: 1.44 MG/DL — HIGH (ref 0.5–1.3)
EGFR: 35 ML/MIN/1.73M2 — LOW
EGFR: 35 ML/MIN/1.73M2 — LOW
ESTIMATED AVERAGE GLUCOSE: 137 MG/DL — HIGH (ref 68–114)
GLUCOSE SERPL-MCNC: 98 MG/DL — SIGNIFICANT CHANGE UP (ref 70–99)
HCT VFR BLD CALC: 36.6 % — SIGNIFICANT CHANGE UP (ref 34.5–45)
HGB BLD-MCNC: 12.4 G/DL — SIGNIFICANT CHANGE UP (ref 11.5–15.5)
LACTATE SERPL-SCNC: 1.3 MMOL/L — SIGNIFICANT CHANGE UP (ref 0.7–2)
MAGNESIUM SERPL-MCNC: 2.1 MG/DL — SIGNIFICANT CHANGE UP (ref 1.6–2.6)
MCHC RBC-ENTMCNC: 30.6 PG — SIGNIFICANT CHANGE UP (ref 27–34)
MCHC RBC-ENTMCNC: 33.9 G/DL — SIGNIFICANT CHANGE UP (ref 32–36)
MCV RBC AUTO: 90.4 FL — SIGNIFICANT CHANGE UP (ref 80–100)
NRBC BLD AUTO-RTO: 0 /100 WBCS — SIGNIFICANT CHANGE UP (ref 0–0)
PHOSPHATE SERPL-MCNC: 4.9 MG/DL — HIGH (ref 2.5–4.5)
PLATELET # BLD AUTO: 167 K/UL — SIGNIFICANT CHANGE UP (ref 150–400)
POTASSIUM SERPL-MCNC: 4.6 MMOL/L — SIGNIFICANT CHANGE UP (ref 3.5–5.3)
POTASSIUM SERPL-SCNC: 4.6 MMOL/L — SIGNIFICANT CHANGE UP (ref 3.5–5.3)
PROT SERPL-MCNC: 6.5 G/DL — SIGNIFICANT CHANGE UP (ref 6–8.3)
RBC # BLD: 4.05 M/UL — SIGNIFICANT CHANGE UP (ref 3.8–5.2)
RBC # FLD: 13.9 % — SIGNIFICANT CHANGE UP (ref 10.3–14.5)
SODIUM SERPL-SCNC: 135 MMOL/L — SIGNIFICANT CHANGE UP (ref 135–145)
WBC # BLD: 11.35 K/UL — HIGH (ref 3.8–10.5)
WBC # FLD AUTO: 11.35 K/UL — HIGH (ref 3.8–10.5)

## 2025-04-30 PROCEDURE — 99223 1ST HOSP IP/OBS HIGH 75: CPT

## 2025-04-30 PROCEDURE — 70551 MRI BRAIN STEM W/O DYE: CPT | Mod: 26

## 2025-04-30 PROCEDURE — 99232 SBSQ HOSP IP/OBS MODERATE 35: CPT

## 2025-04-30 PROCEDURE — 73562 X-RAY EXAM OF KNEE 3: CPT | Mod: 26,RT

## 2025-04-30 RX ORDER — LORAZEPAM 4 MG/ML
0.5 VIAL (ML) INJECTION ONCE
Refills: 0 | Status: DISCONTINUED | OUTPATIENT
Start: 2025-04-30 | End: 2025-04-30

## 2025-04-30 RX ORDER — ONDANSETRON HCL/PF 4 MG/2 ML
4 VIAL (ML) INJECTION EVERY 8 HOURS
Refills: 0 | Status: DISCONTINUED | OUTPATIENT
Start: 2025-04-30 | End: 2025-05-05

## 2025-04-30 RX ORDER — ATORVASTATIN CALCIUM 80 MG/1
40 TABLET, FILM COATED ORAL AT BEDTIME
Refills: 0 | Status: DISCONTINUED | OUTPATIENT
Start: 2025-04-30 | End: 2025-05-05

## 2025-04-30 RX ORDER — ATORVASTATIN CALCIUM 80 MG/1
80 TABLET, FILM COATED ORAL AT BEDTIME
Refills: 0 | Status: DISCONTINUED | OUTPATIENT
Start: 2025-04-30 | End: 2025-04-30

## 2025-04-30 RX ORDER — HEPARIN SODIUM 1000 [USP'U]/ML
5000 INJECTION INTRAVENOUS; SUBCUTANEOUS EVERY 12 HOURS
Refills: 0 | Status: DISCONTINUED | OUTPATIENT
Start: 2025-04-30 | End: 2025-05-05

## 2025-04-30 RX ORDER — ACETAMINOPHEN 500 MG/5ML
650 LIQUID (ML) ORAL EVERY 6 HOURS
Refills: 0 | Status: DISCONTINUED | OUTPATIENT
Start: 2025-04-30 | End: 2025-05-05

## 2025-04-30 RX ORDER — CLOPIDOGREL BISULFATE 75 MG/1
75 TABLET, FILM COATED ORAL DAILY
Refills: 0 | Status: DISCONTINUED | OUTPATIENT
Start: 2025-04-30 | End: 2025-05-05

## 2025-04-30 RX ORDER — QUETIAPINE FUMARATE 25 MG/1
12.5 TABLET ORAL EVERY 12 HOURS
Refills: 0 | Status: DISCONTINUED | OUTPATIENT
Start: 2025-04-30 | End: 2025-05-02

## 2025-04-30 RX ORDER — ASPIRIN 325 MG
81 TABLET ORAL DAILY
Refills: 0 | Status: DISCONTINUED | OUTPATIENT
Start: 2025-04-30 | End: 2025-05-05

## 2025-04-30 RX ORDER — LIDOCAINE HYDROCHLORIDE 20 MG/ML
1 JELLY TOPICAL DAILY
Refills: 0 | Status: DISCONTINUED | OUTPATIENT
Start: 2025-04-30 | End: 2025-05-05

## 2025-04-30 RX ORDER — LORAZEPAM 4 MG/ML
1 VIAL (ML) INJECTION ONCE
Refills: 0 | Status: DISCONTINUED | OUTPATIENT
Start: 2025-04-30 | End: 2025-04-30

## 2025-04-30 RX ADMIN — Medication 81 MILLIGRAM(S): at 13:26

## 2025-04-30 RX ADMIN — QUETIAPINE FUMARATE 12.5 MILLIGRAM(S): 25 TABLET ORAL at 21:02

## 2025-04-30 RX ADMIN — LIDOCAINE HYDROCHLORIDE 1 PATCH: 20 JELLY TOPICAL at 13:28

## 2025-04-30 RX ADMIN — ATORVASTATIN CALCIUM 40 MILLIGRAM(S): 80 TABLET, FILM COATED ORAL at 21:02

## 2025-04-30 RX ADMIN — HEPARIN SODIUM 5000 UNIT(S): 1000 INJECTION INTRAVENOUS; SUBCUTANEOUS at 06:32

## 2025-04-30 RX ADMIN — Medication 1 MILLIGRAM(S): at 23:27

## 2025-04-30 RX ADMIN — Medication 0.5 MILLIGRAM(S): at 10:47

## 2025-04-30 RX ADMIN — HEPARIN SODIUM 5000 UNIT(S): 1000 INJECTION INTRAVENOUS; SUBCUTANEOUS at 19:07

## 2025-04-30 RX ADMIN — LIDOCAINE HYDROCHLORIDE 1 PATCH: 20 JELLY TOPICAL at 20:44

## 2025-04-30 RX ADMIN — Medication 0.5 MILLIGRAM(S): at 12:03

## 2025-04-30 RX ADMIN — Medication 20 MILLIGRAM(S): at 13:25

## 2025-04-30 NOTE — PATIENT PROFILE ADULT - FALL HARM RISK - HARM RISK INTERVENTIONS

## 2025-04-30 NOTE — PROGRESS NOTE ADULT - PROBLEM SELECTOR PLAN 3
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

## 2025-04-30 NOTE — PROGRESS NOTE ADULT - PROBLEM SELECTOR PLAN 2
Patient ambulates with a walker at baseline  Hx of compression fracture in the lumbar region with foot drop   Since yesterday patient has been having difficulty with ambulation  -c/ow right knee pain with mild swelling, f/u Xray   - PT rec RAMESH      ### hx DVT per family  -Pt had hx DVT reported by family but no medication was started.   -will follow up with Doppler US

## 2025-04-30 NOTE — PHYSICAL THERAPY INITIAL EVALUATION ADULT - PASSIVE RANGE OF MOTION EXAMINATION, REHAB EVAL
(-) R ankle PF/Right UE Passive ROM was WFL (within functional limits)/Right LE Passive ROM was WFL (within functional limits)

## 2025-04-30 NOTE — PHYSICAL THERAPY INITIAL EVALUATION ADULT - GENERAL OBSERVATIONS, REHAB EVAL
Patient received supine in bed, daughter at bedside assisting with Portuguese translation, saline lock intact, oriented to self + telemetry, [place]; pleasantly confused and partially cooperative to PT evaluation

## 2025-04-30 NOTE — CONSULT NOTE ADULT - ASSESSMENT
Assessment and Plan:     Assessment :  89y Ambidextruous/ L hand dominant  Female with PMHx of  Dementia, Alport syndrome, HTN, HLD, Lumbar compression Fx, PD presented to [facility] ED on 4/29 for eval of Right sided weakness. Pt has not been feeling well since Monday, with recent back, arms and leg pain, restarted on Gabapentin per PCPs advice, went to bed on Monday night and woke up yesterday morning, noted to have R sided weakness, lethargic and some transient slurred speech. Initial Non con CTH w/ no acute intracranial pathology, R parietal lobe chronic infarct, CTA H/N w/ extracranial severe stenosis 2/2 severe calcified plaque and LM1 Moyamoya appearance w/tangle of vessels and w/distal symmetry which could possibly be from chronic occlusion. Pt not a candidate for any acute stroke intervention. Pt admitted to Tele for further monitoring and R/O stroke, Neuro consulted for the same.       Impression: Acute R sided weakness w/Neuroimaging evidence of Acute/subacute cortical infarcts in L precentral gyrus and L frontoparietal parasaggital region. Upon reviewing the MRI images w/neuro attending Dr. Mills, there appears to be an area of restricted diffusion in R precentral gyrus without an ADC correlate, confirmed and spoke with rads who read the MRI, per Rads appears not to have ADC correlate, but unclear if it could represent an hyperacute infarct.   Given B/L hemispheric infarcts, would likely to R/O cardioembolic process but after Risk Vs benefit discussion with family.   Likely mechanism appears to be Cardioembolic Vs large vessel athero ISO B/L ICA severe calcified plaque.     Reccs:  1)Secondary stroke prevention  - S/P ASA 81mg PO daily  - recommend ASA 81mg PO daily and Plavix 75mg PO daily X 21 days  and then ASA as monotherapy.   - start Atorvastatin 40mg PO daily ( LDL is @ goal <70mg/dl)    2) Stroke risk factors  - A1C: 6.4  - LDL: 61  - HTN    3) Further management  - obtain MRI brain without : done and Acute infarcts as above.   - TTE no bubble needed.   - recommend SBP goal <180,permissive Hypertension X initial 24-48hrs and then gradual Normotension.   - recommend q4hr stroke neuro checks  - Tele monitoring to monitor for Afib or arrhythmia.   - may need outpt neurology follow up  - provide stroke education  - PT eval and Reccs appreciated.     DVT prophylaxis   -Lovenox SQ and SCDs    Informed Primary team MADELIN Reza.     Discussed with Neurology Attending

## 2025-04-30 NOTE — PATIENT PROFILE ADULT - [id number]_GEN_A_NUR
Physical Therapy Screen    Patient Name: Bhavna Al    Today's Date: 6/7/2024     Problem List  Principal Problem:    Lethargy  Active Problems:    Rheumatoid arthritis (HCC)    GERD (gastroesophageal reflux disease)    Essential hypertension    Osteoporosis    Vitamin D deficiency    Hyperparathyroidism (HCC)    Acute kidney injury superimposed on CKD  (HCC)    Paroxysmal atrial fibrillation (HCC)    Psoriasis    Chronic diastolic heart failure (HCC)    Bacteremia       06/07/24 1323   PT Last Visit   PT Visit Date 06/07/24   Note Type   Note type Screen   Cancel Reasons Other     PT order received, chart review completed . Per CM note pt is bed hold at LT facility [place]. Pt is Mirian lift, non ambulatory, wheel chair bound, assist of 1-2 ADLs at baseline. Functional PT assessment not indicated in this setting. Encourage bed mobility , frequent repositioning as able with staff and [facility] team. Will DC PT at this time.   Adelina Brownlee PT DPT    no

## 2025-04-30 NOTE — PHYSICAL THERAPY INITIAL EVALUATION ADULT - MANUAL MUSCLE TESTING RESULTS, REHAB EVAL
+4/5 in LUE, +2/5 in RUE, +3/5 in LLE, +1/5 in RLE, impaired cognition: 3+/5 in LUE, 2/5 in RUE, 3-/5 in LLE, +1/5 in RLE,/no strength deficits were identified

## 2025-04-30 NOTE — PROGRESS NOTE ADULT - PROBLEM SELECTOR PLAN 10
DVT ppx: Heparin    ##Discharge planning  From home, daughter is a caregiver  PT recs RAMESH  f/u TTE and Doppler US r/o DVT

## 2025-04-30 NOTE — PHYSICAL THERAPY INITIAL EVALUATION ADULT - GROSSLY INTACT, SENSORY
unable to assess 2/2 impaired cognition; daughter at bedside states B lower leg have always been sensitive to touch

## 2025-04-30 NOTE — PROGRESS NOTE ADULT - SUBJECTIVE AND OBJECTIVE BOX
NP Note discussed with  Primary Attending    INTERVAL HPI/OVERNIGHT EVENTS: Pt is agitated and attempting to get out of bed. Family is at bedside, no significant aggressive behavior is observed.     MEDICATIONS  (STANDING):  aspirin enteric coated 81 milliGRAM(s) Oral daily  atorvastatin 40 milliGRAM(s) Oral at bedtime  clopidogrel Tablet 75 milliGRAM(s) Oral daily  famotidine    Tablet 20 milliGRAM(s) Oral daily  heparin   Injectable 5000 Unit(s) SubCutaneous every 12 hours  lidocaine   4% Patch 1 Patch Transdermal daily    MEDICATIONS  (PRN):  acetaminophen     Tablet .. 650 milliGRAM(s) Oral every 6 hours PRN Temp greater or equal to 38C (100.4F), Mild Pain (1 - 3)  ondansetron Injectable 4 milliGRAM(s) IV Push every 8 hours PRN Nausea and/or Vomiting  QUEtiapine 12.5 milliGRAM(s) Oral every 12 hours PRN agitation      __________________________________________________  REVIEW OF SYSTEMS:  unable to obtain due to confusion, language barrier      Vital Signs Last 24 Hrs  T(C): 36.4 (30 Apr 2025 13:00), Max: 36.7 (30 Apr 2025 07:40)  T(F): 97.5 (30 Apr 2025 13:00), Max: 98.1 (30 Apr 2025 07:40)  HR: 88 (30 Apr 2025 15:34) (57 - 89)  BP: 155/73 (30 Apr 2025 15:34) (121/76 - 161/81)  BP(mean): 86 (30 Apr 2025 13:00) (86 - 107)  RR: 18 (30 Apr 2025 13:00) (15 - 18)  SpO2: 98% (30 Apr 2025 15:34) (96% - 99%)    Parameters below as of 30 Apr 2025 15:34  Patient On (Oxygen Delivery Method): room air        ________________________________________________  PHYSICAL EXAM:  GENERAL: NAD  HEENT: Normocephalic;  conjunctivae and sclerae clear; moist mucous membranes;   NECK : supple  CHEST/LUNG: Clear to auscultation bilaterally with good air entry   HEART: S1 S2  regular; no murmurs, gallops or rubs  ABDOMEN: Soft, Nontender, Nondistended; Bowel sounds present  EXTREMITIES: no cyanosis; no edema; no calf tenderness  SKIN: warm and dry; no rash  Musk: Right knee mild swelling  NERVOUS SYSTEM:  Awake and alert; Oriented  to self ; mild weakness to left side. Motor strength 3/5 to left arm and leg. no new deficits    _________________________________________________  LABS:                        12.4   11.35 )-----------( 167      ( 30 Apr 2025 06:12 )             36.6     04-30    135  |  104  |  59[H]  ----------------------------<  98  4.6   |  22  |  1.44[H]    Ca    9.4      30 Apr 2025 06:12  Phos  4.9     04-30  Mg     2.1     04-30    TPro  6.5  /  Alb  3.4[L]  /  TBili  0.3  /  DBili  x   /  AST  18  /  ALT  27  /  AlkPhos  86  04-30    PT/INR - ( 29 Apr 2025 19:47 )   PT: 11.8 sec;   INR: 1.02 ratio         PTT - ( 29 Apr 2025 19:47 )  PTT:28.1 sec  Urinalysis Basic - ( 30 Apr 2025 06:12 )    Color: x / Appearance: x / SG: x / pH: x  Gluc: 98 mg/dL / Ketone: x  / Bili: x / Urobili: x   Blood: x / Protein: x / Nitrite: x   Leuk Esterase: x / RBC: x / WBC x   Sq Epi: x / Non Sq Epi: x / Bacteria: x      CAPILLARY BLOOD GLUCOSE      POCT Blood Glucose.: 192 mg/dL (29 Apr 2025 18:47)    RADIOLOGY & ADDITIONAL TESTS:    Imaging  Reviewed:  YES  < from: MR Head No Cont (04.30.25 @ 12:54) >    ACC: [id number] EXAM:  MR BRAIN   ORDERED BY: FLORIAN PIPER     *** ADDENDUM # 1 ***    Addendum to report of examination performed 4/30/2025.    Question of additional subcentimeter focus of recent ischemia right   precentral gyrus, however without definitive corresponding ADC or FLAIR   abnormality, possibly artifactual. Corroborate with clinical findings.    Discussed with Dr. Barbosa at 2:30 PM the day of this exam.    --- End of Report ---    *** END OF ADDENDUM # 1 ***      PROCEDURE DATE:  04/30/2025          INTERPRETATION:  CLINICAL STATEMENT: CVA.    TECHNIQUE: Multiplanar multisequence noncontrast MRI of the brain,   including diffusion-weighted imaging. Multiple images degraded by motion,   limiting evaluation.  COMPARISON: CT brain 4/29/2025.    FINDINGS:    Corpus callosum, pituitary and pineal regions appear unremarkable. No   tonsillar herniation or evidence of marrow replacement process. Extensive   degenerative change visualized cervical spine.    CP angle and IAC regions are grossly unremarkable, as are the globes and   intraconal regions, with the exception of right lens replacement. No   paranasal sinus air-fluid levels or opacification is evident. Scattered   mucosal thickening. No evidence of mastoid effusion.    Age-appropriate atrophy with moderate confluent and scattered foci of   increased T2 and FLAIR signal periventricular and juxtacortical white   matter bilateral cerebral hemispheres; a nonspecific finding which   statistically reflects chronic microvascular ischemic change. Chronic   right parietal infarct. Approximate 1 cm nodular focus of susceptibility   left corona radiata, compatible with hemosiderin deposition versus   mineralization.    Serpiginous, cortically based foci of restricted diffusion with   associated T2-FLAIR prolongation left precentral gyrus and left   frontoparietal parasagittal region. No obvious hemorrhagic   transformation, however suboptimally evaluated secondary to motion.    IMPRESSION:    Limited by motion.    1. Acute-subacute cortical infarcts LEFT precentral gyrus and LEFT   frontoparietal parasagittal region.  2. Additional findings described in detail above.    --- End of Report ---    ***Please see the addendum at the top of this report. It may contain   additional important information or changes.****        ANIL SANDOVAL M.D., ATTENDING RADIOLOGIST  This document has been electronically signed. Apr 30 2025  2:02PM  1st Addendum: ANIL SANDOVAL M.D., ATTENDING RADIOLOGIST  The first addendum was electronically signed on: Apr 30 2025  2:31PM.    < end of copied text >    < from: CT Angio Neck Stroke Protocol w/ IV Cont (04.29.25 @ 19:21) >    IMPRESSION:    CT PERFUSION:  Technical limitations: Motion artifact    Core infarction: 0 ml  Penumbra / tissue at risk for active ischemia: 93 mL    CTA NECK:  Severe bilateral calcified plaque.    CTA HEAD:  The proximal left M1 segment is not well visualized and there is a tangle   of small vessels in this region. Cannot exclude moyamoya syndrome. Distal   vasculature appears fairly symmetrical compared to the right. This is   likely chronic.    Findings were discussed with Dr. KEATON ULLOA [phone] 4/29/2025 7:43   PM by Dr. Christopher with read back confirmation.    --- End of Report ---          < end of copied text >    Consultant(s) Notes Reviewed:   YES      Plan of care was discussed with patient and /or primary care giver; all questions and concerns were addressed

## 2025-04-30 NOTE — PROGRESS NOTE ADULT - PROBLEM SELECTOR PLAN 7
Hx of Dementia   Baseline AAOx1 -2 ( Self)  -will give prn seroquel 12.5mg BID for increased agitaiton  -s/p 1mg ativan for MR (4/30)

## 2025-04-30 NOTE — PHYSICAL THERAPY INITIAL EVALUATION ADULT - ADDITIONAL COMMENTS
Per daughter at bedside, pt ambulatory at home using RW with some assist from daughter, pt is never alone. Pt has a RW and w/c for longer distances. Pt had 1 fall.

## 2025-04-30 NOTE — PROGRESS NOTE ADULT - PROBLEM SELECTOR PLAN 1
BIBEMS for right sided weakness and altered mental status  last known normal was yesterday and 11pm  NIHSS 14  in th ED   Labs: Lact 2.1 >1.3,   -UA negative   -CXR: Lower lobe infiltrate/atelectasis   -Stroke protocol: Motion artifact, Penumbra / tissue at risk for active ischemia, Severe bilateral calcified plaque in the neck. The proximal left M1 segment is not well visualized and there is a tangle of small vessels in this region.   -Dysphagia screen: Passed   -Tele stroke consulted by the ED  > Patient is out of window for TNK w/ LKW 11pm last night  > Deemed not a candidate for mechanical thrombectomy in setting of negative CTA w/ no clear acute LVO on imaging, age , poor baseline function and LKW >23hours ago.  - Admit to Telemetry  - Neuro checks q4  - Monitor BP - allow permissive htn x24hr   - PT consulted  - Consult Neuro dr. Mills following  -MR head result-Acute infarcts as above.   -started ASA 81mg PO daily and Plavix 75mg PO daily X 21 days  and then ASA as monotherapy and lipitor 40mg QD per neurology

## 2025-04-30 NOTE — PHYSICAL THERAPY INITIAL EVALUATION ADULT - IMPAIRMENTS FOUND, PT EVAL
MODIFIED PETER SCALE:5: Severe Disability:  Bedridden, incontinent and requiring constant nursing care and attention./aerobic capacity/endurance/gait, locomotion, and balance/muscle strength

## 2025-04-30 NOTE — CONSULT NOTE ADULT - SUBJECTIVE AND OBJECTIVE BOX
NEUROLOGY CONSULT NOTE  Pt Icelandic speaking. Offered  services, but pts daughter helped w/translation given pts baseline mental status.     HPI: 89y Ambidextruous/ L hand dominant  Female with PMHx of  Dementia, Alport syndrome, HTN, HLD, Lumbar compression Fx, PD presented to [facility] ED on  for eval of Right sided weakness. Per pts daughter @ bedside, pt has not been feeling well since Monday() with C/O pain in her back/arms and legs. Pt was recently treated with gabapentin and still had the prescription, so family called up PCP and gave her Gabapentin(100mg QAM and 300mgQPM) on Monday and Tuesday. Pt normally ambulates slow but able to ambulate w/walker @ baseline. Went to bed on  around 11pm and woke up on Tuesday () AM was unable to move her R side, appeared lethargic to the family and pt slept most of the morning until noon time. Pt noted to be more lethargic and generally weak post lunch yesterday, unable to move, slumped to her Right side, still responsive, talking to the family, no LOC/Fall but very weak and hence was brought to ED. Per ED notes, Telestroke consulted given R sided weakness. Initial Non con CTH w/ no acute intracranial pathology, encephalomalacia and gliosis in R parietal lobe, CTA H/N w/some severe extracranial carotid stenosis 2/2 calcified plaque and C/F moyamoya syndrome given tangle of vessel appearance in LM1 region with symmetrical distal vasculature. pt deemed not a candidate for any stroke intervention, no TNK 2/2 out of the time window, No EVT 2/2 no LVO in CTA.     Of note, pt had a fall last year, has R foot drop, and had a DVT last year per family.       T(C): 36.4 (25 @ 13:00), Max: 36.7 (25 @ 07:40)  HR: 85 (25 @ 13:00) (57 - 89)  BP: 139/66 (25 @ 13:00) (121/76 - 161/81)  RR: 18 (25 @ 13:00) (15 - 18)  SpO2: 96% (25 @ 13:00) (96% - 99%)    PAST MEDICAL & SURGICAL HISTORY:  Dementia  Alport syndrome  HTN (hypertension)  HLD (hyperlipidemia)  History of scoliosis  Lumbar compression fracture  H/O Parkinson's disease  S/P lumbar spine operation      FAMILY HISTORY:  Family history of deafness (Mother, Child, Grandparent, Aunt)    SOCIAL HISTORY:   Patient lives with family @ home.       ROS:   Constitutional: No fever, weight loss or fatigue  Eyes: No eye pain, visual disturbances, or discharge  ENMT:  No difficulty hearing, tinnitus; No sinus or throat pain  Neck: No pain or stiffness  Respiratory: No cough, wheezing, chills or hemoptysis  Cardiovascular: No chest pain, palpitations, shortness of breath, or leg swelling  Neurological: As per HPI      MEDICATIONS  (STANDING):  aspirin enteric coated 81 milliGRAM(s) Oral daily  atorvastatin 40 milliGRAM(s) Oral at bedtime  famotidine    Tablet 20 milliGRAM(s) Oral daily  heparin   Injectable 5000 Unit(s) SubCutaneous every 12 hours  lidocaine   4% Patch 1 Patch Transdermal daily    MEDICATIONS  (PRN):  acetaminophen     Tablet .. 650 milliGRAM(s) Oral every 6 hours PRN Temp greater or equal to 38C (100.4F), Mild Pain (1 - 3)  ondansetron Injectable 4 milliGRAM(s) IV Push every 8 hours PRN Nausea and/or Vomiting    Allergies    penicillin (Rash)  losartan (Angioedema)          Vital Signs Last 24 Hrs  T(C): 36.4 (2025 13:00), Max: 36.7 (2025 07:40)  T(F): 97.5 (2025 13:00), Max: 98.1 (2025 07:40)  HR: 85 (2025 13:00) (57 - 89)  BP: 139/66 (2025 13:00) (121/76 - 161/81)  BP(mean): 86 (2025 13:00) (86 - 107)  RR: 18 (2025 13:00) (15 - 18)  SpO2: 96% (2025 13:00) (96% - 99%)    Parameters below as of 2025 13:00  Patient On (Oxygen Delivery Method): room air        Physical exam:  Constitutional: No acute distress, conversant, pleasantly confused.   Eyes: Anicteric sclerae, moist conjunctivae, see below for CNs  Neck: trachea midline, FROM, supple.   Cardiovascular: Regular rate and rhythm.   Pulmonary: Respirations appear to be even and non labored.       Neurologic:  -Mental status: Awake, alert, pleasantly confused,  partially oriented to self(able to state her name and , Age states she is 80, not oriented to  place, time or situation (baseline, Hx dementia). Speech is somewhat fluent, no dysarthria.  Follows some simple commands. Impaired Attention/concentration intact.     -Cranial nerves:   II: MAVERICK BTT, appears richard not intact.   III, IV, VI: Extraocular movements are intact without nystagmus. Pupils equally round and reactive to light.  VII: Face appears asymmetric w/?subtle R NLFF.   VIII: Hearing is grossly intact.   XII: Tongue protrudes midline  Motor: Normal bulk and tone. LUE briefly antigravity, LLE: antigravity X 5seconds, RUE: Distally briefly antigravity w/++ encouragement X 1-2 second and drifts and hits the bed, not able to lift as high as LUE and only lifts up couple of inches off the bed from elbow down, some trace finger movements. RLE: Briefly antigravity X 1-2 seconds.   Sensation: MAVERICK 2/2 pts baseline mental status.   Coordination: MAVERICK 2/2 pts baseline mental status.   Reflexes: Upgoing toes B/L.   Gait: Deferred 2/2 safety concerns.     NIHSS: 10  Pre mRS: 3        LABS:    Fingerstick Blood Glucose: CAPILLARY BLOOD GLUCOSE      POCT Blood Glucose.: 192 mg/dL (2025 18:47)                        12.4   11.35 )-----------( 167      ( 2025 06:12 )             36.6     04-30    135  |  104  |  59[H]  ----------------------------<  98  4.6   |  22  |  1.44[H]    Ca    9.4      2025 06:12  Phos  4.9     04-30  Mg     2.1     04-30    TPro  6.5  /  Alb  3.4[L]  /  TBili  0.3  /  DBili  x   /  AST  18  /  ALT  27  /  AlkPhos  86  -    PT/INR - ( 2025 19:47 )   PT: 11.8 sec;   INR: 1.02 ratio         PTT - ( 2025 19:47 )  PTT:28.1 sec      Urinalysis Basic - ( 2025 06:12 )    Color: x / Appearance: x / SG: x / pH: x  Gluc: 98 mg/dL / Ketone: x  / Bili: x / Urobili: x   Blood: x / Protein: x / Nitrite: x   Leuk Esterase: x / RBC: x / WBC x   Sq Epi: x / Non Sq Epi: x / Bacteria: x    LDL Cholesterol Calculated: 61 mg/dL (25 @ 19:47)    A1C with Estimated Average Glucose (25 @ 06:12)    A1C with Estimated Average Glucose Result: 6.4      RADIOLOGY & ADDITIONAL STUDIES:     MR Head No Cont (25 @ 12:54)   IMPRESSION:    Limited by motion.    1. Acute-subacute cortical infarcts LEFT precentral gyrus and LEFT   frontoparietal parasagittal region.    Serpiginous, cortically based foci of restricted diffusion with   associated T2-FLAIR prolongation left precentral gyrus and left   frontoparietal parasagittal region. No obvious hemorrhagic   transformation, howeversuboptimally evaluated secondary to motion.  Chronic right parietal infarct. Approximate 1 cm nodular focus of susceptibility   left corona radiata, compatible with hemosiderin deposition versus   mineralization.      CT Brain Stroke Protocol (25 @ 19:03)   IMPRESSION:  No acute intracranial hemorrhage, mass effect, or midline shift.    CT Angio Neck Stroke Protocol w/ IV Cont (25 @ 19:21)   IMPRESSION:    CT PERFUSION:  Technical limitations: Motion artifact    Core infarction: 0 ml  Penumbra / tissue at risk for active ischemia: 93 mL    CTA NECK:  Severe bilateral calcified plaque.    CTA HEAD:  The proximal left M1 segment is not well visualized and there is a tangle   of small vessels in this region. Cannot exclude moyamoya syndrome. Distal   vasculature appears fairly symmetrical compared to the right. This is   likely chronic.          -----------------------------------------------------------------------------------------------------------------  IV-TNK (Y/N):   No                             Reason IV-TNK not given: Out of the time window.

## 2025-04-30 NOTE — PHYSICAL THERAPY INITIAL EVALUATION ADULT - MODIFIED CLINICAL TEST OF SENSORY INTEGRATION IN BALANCE TEST
POSTURAL ASSESSMENT SCALE FOR STROKE (PASS) SCORE: (Maintaining Posture) 0 + (Changing Posture) 4= (TOTAL SCORE) 4/36

## 2025-04-30 NOTE — PHYSICAL THERAPY INITIAL EVALUATION ADULT - PERTINENT HX OF CURRENT PROBLEM, REHAB EVAL
Patient is an 89 yo F, with PMH of Dementia ( AAOX1-2 - self), Alport disease (carrier),  HTN, HLD, Scoliosis, Compression fracture L1, L4-5 with R foot drop (Aug 2024), Parkinson disease, BIBEMS for right sided weakness and altered mental status. NIHSS 14  in th ED   Pt admitted to telemetry for CVA, ambulatory dysfunction. Neurology consulted.  MR head: Acute-subacute cortical infarcts LEFT precentral gyrus and LEFT   frontoparietal parasagittal region. Chronic right parietal infarct. Approximate 1 cm nodular focus of susceptibility left corona radiata, compatible with hemosiderin deposition versus   mineralization.

## 2025-05-01 ENCOUNTER — TRANSCRIPTION ENCOUNTER (OUTPATIENT)
Age: 89
End: 2025-05-01

## 2025-05-01 LAB
ALBUMIN SERPL ELPH-MCNC: 3.3 G/DL — LOW (ref 3.5–5)
ALP SERPL-CCNC: 85 U/L — SIGNIFICANT CHANGE UP (ref 40–120)
ALT FLD-CCNC: 31 U/L DA — SIGNIFICANT CHANGE UP (ref 10–60)
ANION GAP SERPL CALC-SCNC: 9 MMOL/L — SIGNIFICANT CHANGE UP (ref 5–17)
AST SERPL-CCNC: 32 U/L — SIGNIFICANT CHANGE UP (ref 10–40)
BASOPHILS # BLD AUTO: 0.07 K/UL — SIGNIFICANT CHANGE UP (ref 0–0.2)
BASOPHILS NFR BLD AUTO: 0.8 % — SIGNIFICANT CHANGE UP (ref 0–2)
BILIRUB SERPL-MCNC: 0.4 MG/DL — SIGNIFICANT CHANGE UP (ref 0.2–1.2)
BUN SERPL-MCNC: 40 MG/DL — HIGH (ref 7–18)
CALCIUM SERPL-MCNC: 9.4 MG/DL — SIGNIFICANT CHANGE UP (ref 8.4–10.5)
CHLORIDE SERPL-SCNC: 105 MMOL/L — SIGNIFICANT CHANGE UP (ref 96–108)
CO2 SERPL-SCNC: 23 MMOL/L — SIGNIFICANT CHANGE UP (ref 22–31)
CREAT SERPL-MCNC: 1.17 MG/DL — SIGNIFICANT CHANGE UP (ref 0.5–1.3)
EGFR: 45 ML/MIN/1.73M2 — LOW
EGFR: 45 ML/MIN/1.73M2 — LOW
EOSINOPHIL # BLD AUTO: 0.19 K/UL — SIGNIFICANT CHANGE UP (ref 0–0.5)
EOSINOPHIL NFR BLD AUTO: 2 % — SIGNIFICANT CHANGE UP (ref 0–6)
GLUCOSE SERPL-MCNC: 90 MG/DL — SIGNIFICANT CHANGE UP (ref 70–99)
HCT VFR BLD CALC: 41.9 % — SIGNIFICANT CHANGE UP (ref 34.5–45)
HGB BLD-MCNC: 14 G/DL — SIGNIFICANT CHANGE UP (ref 11.5–15.5)
IMM GRANULOCYTES NFR BLD AUTO: 0.4 % — SIGNIFICANT CHANGE UP (ref 0–0.9)
LYMPHOCYTES # BLD AUTO: 2.21 K/UL — SIGNIFICANT CHANGE UP (ref 1–3.3)
LYMPHOCYTES # BLD AUTO: 23.7 % — SIGNIFICANT CHANGE UP (ref 13–44)
MCHC RBC-ENTMCNC: 30.5 PG — SIGNIFICANT CHANGE UP (ref 27–34)
MCHC RBC-ENTMCNC: 33.4 G/DL — SIGNIFICANT CHANGE UP (ref 32–36)
MCV RBC AUTO: 91.3 FL — SIGNIFICANT CHANGE UP (ref 80–100)
MONOCYTES # BLD AUTO: 1.01 K/UL — HIGH (ref 0–0.9)
MONOCYTES NFR BLD AUTO: 10.8 % — SIGNIFICANT CHANGE UP (ref 2–14)
NEUTROPHILS # BLD AUTO: 5.81 K/UL — SIGNIFICANT CHANGE UP (ref 1.8–7.4)
NEUTROPHILS NFR BLD AUTO: 62.3 % — SIGNIFICANT CHANGE UP (ref 43–77)
NRBC BLD AUTO-RTO: 0 /100 WBCS — SIGNIFICANT CHANGE UP (ref 0–0)
PLATELET # BLD AUTO: 167 K/UL — SIGNIFICANT CHANGE UP (ref 150–400)
POTASSIUM SERPL-MCNC: 4.8 MMOL/L — SIGNIFICANT CHANGE UP (ref 3.5–5.3)
POTASSIUM SERPL-SCNC: 4.8 MMOL/L — SIGNIFICANT CHANGE UP (ref 3.5–5.3)
PROT SERPL-MCNC: 7.1 G/DL — SIGNIFICANT CHANGE UP (ref 6–8.3)
RBC # BLD: 4.59 M/UL — SIGNIFICANT CHANGE UP (ref 3.8–5.2)
RBC # FLD: 14.1 % — SIGNIFICANT CHANGE UP (ref 10.3–14.5)
SODIUM SERPL-SCNC: 137 MMOL/L — SIGNIFICANT CHANGE UP (ref 135–145)
WBC # BLD: 9.33 K/UL — SIGNIFICANT CHANGE UP (ref 3.8–10.5)
WBC # FLD AUTO: 9.33 K/UL — SIGNIFICANT CHANGE UP (ref 3.8–10.5)

## 2025-05-01 PROCEDURE — 99232 SBSQ HOSP IP/OBS MODERATE 35: CPT

## 2025-05-01 PROCEDURE — 93970 EXTREMITY STUDY: CPT | Mod: 26

## 2025-05-01 RX ADMIN — HEPARIN SODIUM 5000 UNIT(S): 1000 INJECTION INTRAVENOUS; SUBCUTANEOUS at 05:40

## 2025-05-01 RX ADMIN — Medication 20 MILLIGRAM(S): at 11:44

## 2025-05-01 RX ADMIN — LIDOCAINE HYDROCHLORIDE 1 PATCH: 20 JELLY TOPICAL at 01:00

## 2025-05-01 RX ADMIN — Medication 650 MILLIGRAM(S): at 19:36

## 2025-05-01 RX ADMIN — Medication 650 MILLIGRAM(S): at 18:36

## 2025-05-01 RX ADMIN — Medication 81 MILLIGRAM(S): at 11:44

## 2025-05-01 RX ADMIN — ATORVASTATIN CALCIUM 40 MILLIGRAM(S): 80 TABLET, FILM COATED ORAL at 21:52

## 2025-05-01 RX ADMIN — HEPARIN SODIUM 5000 UNIT(S): 1000 INJECTION INTRAVENOUS; SUBCUTANEOUS at 17:08

## 2025-05-01 RX ADMIN — CLOPIDOGREL BISULFATE 75 MILLIGRAM(S): 75 TABLET, FILM COATED ORAL at 11:45

## 2025-05-01 NOTE — PROGRESS NOTE ADULT - SUBJECTIVE AND OBJECTIVE BOX
NP Note discussed with  Primary Attending    Patient is a 89y old  Female who presents with a chief complaint of CVA (01 May 2025 13:33)      INTERVAL HPI/OVERNIGHT EVENTS: no new complaints    MEDICATIONS  (STANDING):  aspirin enteric coated 81 milliGRAM(s) Oral daily  atorvastatin 40 milliGRAM(s) Oral at bedtime  clopidogrel Tablet 75 milliGRAM(s) Oral daily  famotidine    Tablet 20 milliGRAM(s) Oral daily  heparin   Injectable 5000 Unit(s) SubCutaneous every 12 hours  lidocaine   4% Patch 1 Patch Transdermal daily    MEDICATIONS  (PRN):  acetaminophen     Tablet .. 650 milliGRAM(s) Oral every 6 hours PRN Temp greater or equal to 38C (100.4F), Mild Pain (1 - 3)  ondansetron Injectable 4 milliGRAM(s) IV Push every 8 hours PRN Nausea and/or Vomiting  QUEtiapine 12.5 milliGRAM(s) Oral every 12 hours PRN agitation      __________________________________________________  REVIEW OF SYSTEMS:    CONSTITUTIONAL: No fever,   EYES: no acute visual disturbances  NECK: No pain or stiffness  RESPIRATORY: No cough; No shortness of breath  CARDIOVASCULAR: No chest pain, no palpitations  GASTROINTESTINAL: No pain. No nausea or vomiting; No diarrhea   NEUROLOGICAL: No headache or numbness, no tremors  MUSCULOSKELETAL: No joint pain, no muscle pain  GENITOURINARY: no dysuria, no frequency, no hesitancy  PSYCHIATRY: no depression , no anxiety  ALL OTHER  ROS negative        Vital Signs Last 24 Hrs  T(C): 36.3 (01 May 2025 11:05), Max: 36.8 (01 May 2025 00:32)  T(F): 97.3 (01 May 2025 11:05), Max: 98.2 (01 May 2025 00:32)  HR: 91 (01 May 2025 11:05) (62 - 94)  BP: 146/88 (01 May 2025 11:05) (130/77 - 166/85)  BP(mean): --  RR: 18 (01 May 2025 11:05) (16 - 19)  SpO2: 99% (01 May 2025 11:05) (96% - 100%)    Parameters below as of 01 May 2025 11:05  Patient On (Oxygen Delivery Method): room air        ________________________________________________  PHYSICAL EXAM:  GENERAL: NAD  HEENT: Normocephalic;  conjunctivae and sclerae clear; moist mucous membranes;   NECK : supple  CHEST/LUNG: Clear to auscultation bilaterally with good air entry   HEART: S1 S2  regular; no murmurs, gallops or rubs  ABDOMEN: Soft, Nontender, Nondistended; Bowel sounds present  EXTREMITIES: no cyanosis; no edema; no calf tenderness  SKIN: warm and dry; no rash  NERVOUS SYSTEM:  Awake and alert; Oriented  to place, person and time ; no new deficits    _________________________________________________  LABS:                        14.0   9.33  )-----------( 167      ( 01 May 2025 06:20 )             41.9     05-01    137  |  105  |  40[H]  ----------------------------<  90  4.8   |  23  |  1.17    Ca    9.4      01 May 2025 06:20  Phos  4.9     04-30  Mg     2.1     04-30    TPro  7.1  /  Alb  3.3[L]  /  TBili  0.4  /  DBili  x   /  AST  32  /  ALT  31  /  AlkPhos  85  05-01    PT/INR - ( 29 Apr 2025 19:47 )   PT: 11.8 sec;   INR: 1.02 ratio         PTT - ( 29 Apr 2025 19:47 )  PTT:28.1 sec  Urinalysis Basic - ( 01 May 2025 06:20 )    Color: x / Appearance: x / SG: x / pH: x  Gluc: 90 mg/dL / Ketone: x  / Bili: x / Urobili: x   Blood: x / Protein: x / Nitrite: x   Leuk Esterase: x / RBC: x / WBC x   Sq Epi: x / Non Sq Epi: x / Bacteria: x      CAPILLARY BLOOD GLUCOSE            RADIOLOGY & ADDITIONAL TESTS:  < from: US Duplex Venous Lower Ext Complete, Bilateral (05.01.25 @ 11:06) >    ACC: [id number] EXAM:  US DPLX LWR EXT VEINS COMPL BI   ORDERED BY: ORI ANDERSON     PROCEDURE DATE:  05/01/2025          INTERPRETATION:  CLINICAL INFORMATION: Bilateral leg edema.    COMPARISON: None available.    TECHNIQUE: Duplex sonography of the BILATERAL LOWER extremity veins with   color and spectral Doppler, with and without compression.    FINDINGS:    Limited study due to difficulty with patient positioning.    RIGHT:  Normal compressibility of the RIGHT common femoral, femoral andpopliteal   veins. Doppler examination shows normal spontaneous and phasic flow. No   RIGHT calf vein thrombosis is detected.    LEFT:  Normal compressibility of the LEFT common femoral, femoral and popliteal   veins. Doppler examination shows normalspontaneous and phasic flow. No   LEFT calf vein thrombosis is detected.    IMPRESSION:    No acute DVT of the right or left lower extremity. Limited study due to   difficulty with patient positioning.    --- End of Report ---            NATE MI M.D., ATTENDING RADIOLOGIST  This document has been electronically signed. May  1 2025 11:26AM    < end of copied text >  < from: MR Head No Cont (04.30.25 @ 12:54) >    ACC: [id number] EXAM:  MR BRAIN   ORDERED BY: FLORIAN PIPER     *** ADDENDUM # 1 ***    Addendum to report of examination performed 4/30/2025.    Question of additional subcentimeter focus of recent ischemia right   precentral gyrus, however without definitive corresponding ADC or FLAIR   abnormality, possibly artifactual. Corroborate with clinical findings.    Discussed with Dr. Barbosa at 2:30 PM the day of this exam.    --- End of Report ---    *** END OF ADDENDUM # 1 ***      PROCEDURE DATE:  04/30/2025          INTERPRETATION:  CLINICAL STATEMENT: CVA.    TECHNIQUE: Multiplanar multisequence noncontrast MRI of the brain,   including diffusion-weighted imaging. Multiple images degraded by motion,   limiting evaluation.  COMPARISON: CT brain 4/29/2025.    FINDINGS:    Corpus callosum, pituitary and pineal regions appear unremarkable. No   tonsillar herniation or evidence of marrow replacement process. Extensive   degenerative change visualized cervical spine.    CP angle and IAC regions are grossly unremarkable, as are the globes and   intraconal regions, with the exception of right lens replacement. No   paranasal sinus air-fluid levels or opacification is evident. Scattered   mucosal thickening. No evidence of mastoid effusion.    Age-appropriate atrophy with moderate confluent and scattered foci of   increased T2 and FLAIR signal periventricular and juxtacortical white   matter bilateral cerebral hemispheres; a nonspecific finding which   statistically reflects chronic microvascular ischemic change. Chronic   right parietal infarct. Approximate 1 cm nodular focus of susceptibility   left corona radiata, compatible with hemosiderin deposition versus   mineralization.    Serpiginous, cortically based foci of restricted diffusion with   associated T2-FLAIR prolongation left precentral gyrus and left   frontoparietal parasagittal region. No obvious hemorrhagic   transformation, however suboptimally evaluated secondary to motion.    IMPRESSION:    Limited by motion.    1. Acute-subacute cortical infarcts LEFT precentral gyrus and LEFT   frontoparietal parasagittal region.  2. Additional findings described in detail above.    --- End of Report ---    ***Please see the addendum at the top of this report. It may contain   additional important information or changes.****        ANIL SANDOVAL M.D., ATTENDING RADIOLOGIST  This document has been electronically signed. Apr 30 2025  2:02PM  1st Addendum: ANIL SANDOVAL M.D., ATTENDING RADIOLOGIST  The first addendum was electronically signed on: Apr 30 2025  2:31PM.    < end of copied text >    Imaging Personally Reviewed:  YES    Consultant(s) Notes Reviewed:   YES    Plan of care was discussed with patient and /or primary care giver; all questions and concerns were addressed and care was aligned with patient's wishes.     NP Note discussed with  Primary Attending    Patient is a 89y old  Female who presents with a chief complaint of CVA (01 May 2025 13:33)      INTERVAL HPI/OVERNIGHT EVENTS: no new complaints    MEDICATIONS  (STANDING):  aspirin enteric coated 81 milliGRAM(s) Oral daily  atorvastatin 40 milliGRAM(s) Oral at bedtime  clopidogrel Tablet 75 milliGRAM(s) Oral daily  famotidine    Tablet 20 milliGRAM(s) Oral daily  heparin   Injectable 5000 Unit(s) SubCutaneous every 12 hours  lidocaine   4% Patch 1 Patch Transdermal daily    MEDICATIONS  (PRN):  acetaminophen     Tablet .. 650 milliGRAM(s) Oral every 6 hours PRN Temp greater or equal to 38C (100.4F), Mild Pain (1 - 3)  ondansetron Injectable 4 milliGRAM(s) IV Push every 8 hours PRN Nausea and/or Vomiting  QUEtiapine 12.5 milliGRAM(s) Oral every 12 hours PRN agitation      __________________________________________________  REVIEW OF SYSTEMS:    CONSTITUTIONAL: No fever,   EYES: no acute visual disturbances  NECK: No pain or stiffness  RESPIRATORY: No cough; No shortness of breath  CARDIOVASCULAR: No chest pain, no palpitations  GASTROINTESTINAL: No pain. No nausea or vomiting; No diarrhea   NEUROLOGICAL: No headache or numbness, no tremors  MUSCULOSKELETAL: No joint pain, no muscle pain  GENITOURINARY: no dysuria, no frequency, no hesitancy  PSYCHIATRY: no depression , no anxiety  ALL OTHER  ROS negative        Vital Signs Last 24 Hrs  T(C): 36.3 (01 May 2025 11:05), Max: 36.8 (01 May 2025 00:32)  T(F): 97.3 (01 May 2025 11:05), Max: 98.2 (01 May 2025 00:32)  HR: 91 (01 May 2025 11:05) (62 - 94)  BP: 146/88 (01 May 2025 11:05) (130/77 - 166/85)  BP(mean): --  RR: 18 (01 May 2025 11:05) (16 - 19)  SpO2: 99% (01 May 2025 11:05) (96% - 100%)    Parameters below as of 01 May 2025 11:05  Patient On (Oxygen Delivery Method): room air        ________________________________________________  PHYSICAL EXAM:  GENERAL: NAD  HEENT: Normocephalic;  conjunctivae and sclerae clear; moist mucous membranes;   NECK : supple  CHEST/LUNG: Clear to auscultation bilaterally with good air entry   HEART: S1 S2  regular; no murmurs, gallops or rubs  ABDOMEN: Soft, Nontender, Nondistended; Bowel sounds present  EXTREMITIES: no cyanosis; no edema; no calf tenderness  SKIN: warm and dry; no rash  NERVOUS SYSTEM:  Awake and alert; Oriented  to person ; no new deficits    _________________________________________________  LABS:                        14.0   9.33  )-----------( 167      ( 01 May 2025 06:20 )             41.9     05-01    137  |  105  |  40[H]  ----------------------------<  90  4.8   |  23  |  1.17    Ca    9.4      01 May 2025 06:20  Phos  4.9     04-30  Mg     2.1     04-30    TPro  7.1  /  Alb  3.3[L]  /  TBili  0.4  /  DBili  x   /  AST  32  /  ALT  31  /  AlkPhos  85  05-01    PT/INR - ( 29 Apr 2025 19:47 )   PT: 11.8 sec;   INR: 1.02 ratio         PTT - ( 29 Apr 2025 19:47 )  PTT:28.1 sec  Urinalysis Basic - ( 01 May 2025 06:20 )    Color: x / Appearance: x / SG: x / pH: x  Gluc: 90 mg/dL / Ketone: x  / Bili: x / Urobili: x   Blood: x / Protein: x / Nitrite: x   Leuk Esterase: x / RBC: x / WBC x   Sq Epi: x / Non Sq Epi: x / Bacteria: x      CAPILLARY BLOOD GLUCOSE            RADIOLOGY & ADDITIONAL TESTS:  < from: US Duplex Venous Lower Ext Complete, Bilateral (05.01.25 @ 11:06) >    ACC: [id number] EXAM:  US DPLX LWR EXT VEINS COMPL BI   ORDERED BY: ORI ANDERSON     PROCEDURE DATE:  05/01/2025          INTERPRETATION:  CLINICAL INFORMATION: Bilateral leg edema.    COMPARISON: None available.    TECHNIQUE: Duplex sonography of the BILATERAL LOWER extremity veins with   color and spectral Doppler, with and without compression.    FINDINGS:    Limited study due to difficulty with patient positioning.    RIGHT:  Normal compressibility of the RIGHT common femoral, femoral andpopliteal   veins. Doppler examination shows normal spontaneous and phasic flow. No   RIGHT calf vein thrombosis is detected.    LEFT:  Normal compressibility of the LEFT common femoral, femoral and popliteal   veins. Doppler examination shows normalspontaneous and phasic flow. No   LEFT calf vein thrombosis is detected.    IMPRESSION:    No acute DVT of the right or left lower extremity. Limited study due to   difficulty with patient positioning.    --- End of Report ---            NATE MI M.D., ATTENDING RADIOLOGIST  This document has been electronically signed. May  1 2025 11:26AM    < end of copied text >  < from: MR Head No Cont (04.30.25 @ 12:54) >    ACC: [id number] EXAM:  MR BRAIN   ORDERED BY: FLORIAN PIPER     *** ADDENDUM # 1 ***    Addendum to report of examination performed 4/30/2025.    Question of additional subcentimeter focus of recent ischemia right   precentral gyrus, however without definitive corresponding ADC or FLAIR   abnormality, possibly artifactual. Corroborate with clinical findings.    Discussed with Dr. Barbosa at 2:30 PM the day of this exam.    --- End of Report ---    *** END OF ADDENDUM # 1 ***      PROCEDURE DATE:  04/30/2025          INTERPRETATION:  CLINICAL STATEMENT: CVA.    TECHNIQUE: Multiplanar multisequence noncontrast MRI of the brain,   including diffusion-weighted imaging. Multiple images degraded by motion,   limiting evaluation.  COMPARISON: CT brain 4/29/2025.    FINDINGS:    Corpus callosum, pituitary and pineal regions appear unremarkable. No   tonsillar herniation or evidence of marrow replacement process. Extensive   degenerative change visualized cervical spine.    CP angle and IAC regions are grossly unremarkable, as are the globes and   intraconal regions, with the exception of right lens replacement. No   paranasal sinus air-fluid levels or opacification is evident. Scattered   mucosal thickening. No evidence of mastoid effusion.    Age-appropriate atrophy with moderate confluent and scattered foci of   increased T2 and FLAIR signal periventricular and juxtacortical white   matter bilateral cerebral hemispheres; a nonspecific finding which   statistically reflects chronic microvascular ischemic change. Chronic   right parietal infarct. Approximate 1 cm nodular focus of susceptibility   left corona radiata, compatible with hemosiderin deposition versus   mineralization.    Serpiginous, cortically based foci of restricted diffusion with   associated T2-FLAIR prolongation left precentral gyrus and left   frontoparietal parasagittal region. No obvious hemorrhagic   transformation, however suboptimally evaluated secondary to motion.    IMPRESSION:    Limited by motion.    1. Acute-subacute cortical infarcts LEFT precentral gyrus and LEFT   frontoparietal parasagittal region.  2. Additional findings described in detail above.    --- End of Report ---    ***Please see the addendum at the top of this report. It may contain   additional important information or changes.****        ANIL SANDOVAL M.D., ATTENDING RADIOLOGIST  This document has been electronically signed. Apr 30 2025  2:02PM  1st Addendum: ANIL SANDOVAL M.D., ATTENDING RADIOLOGIST  The first addendum was electronically signed on: Apr 30 2025  2:31PM.    < end of copied text >    Imaging Personally Reviewed:  YES    Consultant(s) Notes Reviewed:   YES    Plan of care was discussed with patient and /or primary care giver; all questions and concerns were addressed and care was aligned with patient's wishes.

## 2025-05-01 NOTE — PROGRESS NOTE ADULT - PROBLEM SELECTOR PLAN 10
DVT ppx: Heparin    ##Discharge planning  From home, daughter is a caregiver  PT recs RAMESH  f/u TTE and Doppler US r/o DVT DVT ppx: Heparin    ##Discharge planning  From home, daughter is a caregiver  PT recmekhi CHANDLER-accepted at [facility]   f/u repeat MRI

## 2025-05-01 NOTE — DISCHARGE NOTE PROVIDER - HOSPITAL COURSE
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 Further evaluation with MRI brain showing L ischemic stroke.    neuro reccs:  1) Secondary stroke prevention  - S/P ASA 81mg PO daily  - recommend ASA 81mg PO daily and Plavix 75mg PO daily X 21 days and then ASA as monotherapy.   - start Atorvastatin 40mg PO daily ( LDL is @ goal <70mg/dl)    2) Stroke risk factors  - A1C: 6.4  - LDL: 61  - HTN    3) Further management  - obtain MRI brain without : done and Acute infarcts as above.   - TTE no bubble needed.   - recommend SBP goal <180,permissive Hypertension X initial 24-48hrs and then gradual Normotension.   - recommend q4hr stroke neuro checks  - Tele monitoring to monitor for Afib or arrhythmia.   - may need outpt neurology follow up  - provide stroke education  - PT eval and Reccs RAMESH.    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 Further evaluation with MRI brain showing L ischemic stroke.    neuro recs:  1) Secondary stroke prevention  - S/P ASA 81mg PO daily  - recommend ASA 81mg PO daily and Plavix 75mg PO daily X 21 days and then ASA as monotherapy.   - start Atorvastatin 40mg PO daily ( LDL is @ goal <70mg/dl)    2) Stroke risk factors  - A1C: 6.4  - LDL: 61  - HTN    3) Further management  - obtain MRI brain without : done and Acute infarcts as above.   - TTE no bubble needed.   - recommend SBP goal <180,permissive Hypertension X initial 24-48hrs and then gradual Normotension.   - recommend q4hr stroke neuro checks  - Tele monitoring to monitor for Afib or arrhythmia.   - Trf pto [facility]   - Provided stroke education  - PT eval rec RAMESH.       Patient is medically cleared for transfer to [facility] per Neuro.    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 Further evaluation with MRI brain showing L ischemic stroke. Neurology consulted, initially recommended DAPT with ASA 81mg PO daily and Plavix 75mg PO daily X 21 days along with repeat MRI. Patient was not able to repeat MRI due to claustrophobia and agitation. Neurology later discussed with stroke team ad [facility], and the decision was made to transfer to [facility] for potential surgical revascularization (e.g. endartarectomy or other artery to artery graft) for secondary stroke prevention.

## 2025-05-01 NOTE — DISCHARGE NOTE PROVIDER - ATTENDING DISCHARGE PHYSICAL EXAMINATION:
PHYSICAL EXAM:  GENERAL: NAD  HEAD:  Atraumatic, Normocephalic  EYES: conjunctiva and sclera clear  NECK: Supple, No JVD  CHEST/LUNG: Clear to auscultation bilaterally; No wheeze  HEART: Regular rate and rhythm; No murmurs, rubs, or gallops  ABDOMEN: Soft, Nontender, Nondistended; Bowel sounds present  EXTREMITIES:  No clubbing, cyanosis, or edema  PSYCH: AAOx1  SKIN: No rashes or lesions

## 2025-05-01 NOTE — PROGRESS NOTE ADULT - PROBLEM SELECTOR PLAN 1
BIBEMS for right sided weakness and altered mental status  last known normal was yesterday and 11pm  NIHSS 14  in th ED   Labs: Lact 2.1 >1.3,   -UA negative   -CXR: Lower lobe infiltrate/atelectasis   -Stroke protocol: Motion artifact, Penumbra / tissue at risk for active ischemia, Severe bilateral calcified plaque in the neck. The proximal left M1 segment is not well visualized and there is a tangle of small vessels in this region.   -Dysphagia screen: Passed   -Tele stroke consulted by the ED  > Patient is out of window for TNK w/ LKW 11pm last night  > Deemed not a candidate for mechanical thrombectomy in setting of negative CTA w/ no clear acute LVO on imaging, age , poor baseline function and LKW >23hours ago.  - Admit to Telemetry  - Neuro checks q4  - Monitor BP - allow permissive htn x24hr   - PT consulted  - Consult Neuro dr. Mills following  -MR head result-Acute infarcts as above.   -started ASA 81mg PO daily and Plavix 75mg PO daily X 21 days  and then ASA as monotherapy and lipitor 40mg QD per neurology BIBEMS for right sided weakness and altered mental status  last known normal was yesterday and 11pm  NIHSS 14  in th ED   Labs: Lact 2.1 >1.3,   -UA negative   -CXR: Lower lobe infiltrate/atelectasis   -Stroke protocol: Motion artifact, Penumbra / tissue at risk for active ischemia, Severe bilateral calcified plaque in the neck. The proximal left M1 segment is not well visualized and there is a tangle of small vessels in this region.   -Dysphagia screen: Passed   -Tele stroke consulted by the ED  > Patient is out of window for TNK w/ LKW 11pm last night  > Deemed not a candidate for mechanical thrombectomy in setting of negative CTA w/ no clear acute LVO on imaging, age , poor baseline function and LKW >23hours ago.  - Admit to Telemetry  - Neuro checks q4  - Monitor BP - allow permissive htn x24hr   - PT rec RAMESH   - Consult Neuro dr. Mills following  -MR head result-Acute infarcts as above.   -started ASA 81mg PO daily and Plavix 75mg PO daily X 21 days  and then ASA as monotherapy and lipitor 40mg QD per neurology  -f/u repeat MR head to re evaluate Suspected hyper acute right precentral gyrus hemisphere infarct vs artifact.

## 2025-05-01 NOTE — PROGRESS NOTE ADULT - SUBJECTIVE AND OBJECTIVE BOX
Neurology Stroke Progress Note    INTERVAL HPI/OVERNIGHT EVENTS:  Patient seen @ bedside, Limited neuro exam as pt was sleeping, pt did not sleep last night, was awake, agitated and confused and received some sedation for testing prior to neuro exam, hence deferred @ this point of time. Dr. Mills D/W Pts daughter @ bedside about MRI results which showed L sided acute infarcts, and a suspicious hyperintensity R precentral gyrus with no apparent ADC correlate which could be artifact Vs another infarct. Explained by Dr. Mills about further steps whether to proceed w/cardi embolic w/u. Pt may need rpt MRI to F/U on / R Precentral gyrus Hyperintensity and if +ve on Rpt MRI would need further cardioembolic W/U and possibly may need MANISHA. Family agrees for Rpt MRI and to decide further based on the results of Rpt MRI.     MEDICATIONS  (STANDING):  aspirin enteric coated 81 milliGRAM(s) Oral daily  atorvastatin 40 milliGRAM(s) Oral at bedtime  clopidogrel Tablet 75 milliGRAM(s) Oral daily  famotidine    Tablet 20 milliGRAM(s) Oral daily  heparin   Injectable 5000 Unit(s) SubCutaneous every 12 hours  lidocaine   4% Patch 1 Patch Transdermal daily    MEDICATIONS  (PRN):  acetaminophen     Tablet .. 650 milliGRAM(s) Oral every 6 hours PRN Temp greater or equal to 38C (100.4F), Mild Pain (1 - 3)  ondansetron Injectable 4 milliGRAM(s) IV Push every 8 hours PRN Nausea and/or Vomiting  QUEtiapine 12.5 milliGRAM(s) Oral every 12 hours PRN agitation      Allergies  penicillin (Rash)  losartan (Angioedema)        Vital Signs Last 24 Hrs  T(C): 36.7 (01 May 2025 16:06), Max: 36.8 (01 May 2025 00:32)  T(F): 98.1 (01 May 2025 16:06), Max: 98.2 (01 May 2025 00:32)  HR: 66 (01 May 2025 16:43) (66 - 98)  BP: 164/79 (01 May 2025 16:43) (130/77 - 174/71)  BP(mean): --  RR: 18 (01 May 2025 16:06) (16 - 18)  SpO2: 99% (01 May 2025 16:43) (96% - 100%)    Parameters below as of 01 May 2025 16:43  Patient On (Oxygen Delivery Method): room air        Physical exam:  General: Resting in bed.   Neck: trachea midline.  Cardiovascular: Regular rate and rhythm on the monitor.   Pulmonary: respiration appears to be even and non labored.   GI: Abdomen soft, non-distended, non-tender  Extremities: Radial and DP pulses +2, no edema    Neurologic: Deferred today as it will be limited given pts sedation and agitation.       LABS:                        14.0   9.33  )-----------( 167      ( 01 May 2025 06:20 )             41.9     05-01    137  |  105  |  40[H]  ----------------------------<  90  4.8   |  23  |  1.17    Ca    9.4      01 May 2025 06:20  Phos  4.9     04-30  Mg     2.1     04-30    TPro  7.1  /  Alb  3.3[L]  /  TBili  0.4  /  DBili  x   /  AST  32  /  ALT  31  /  AlkPhos  85  05-01    PT/INR - ( 29 Apr 2025 19:47 )   PT: 11.8 sec;   INR: 1.02 ratio         PTT - ( 29 Apr 2025 19:47 )  PTT:28.1 sec  Urinalysis Basic - ( 01 May 2025 06:20 )    Color: x / Appearance: x / SG: x / pH: x  Gluc: 90 mg/dL / Ketone: x  / Bili: x / Urobili: x   Blood: x / Protein: x / Nitrite: x   Leuk Esterase: x / RBC: x / WBC x   Sq Epi: x / Non Sq Epi: x / Bacteria: x        RADIOLOGY & ADDITIONAL TESTS:       MR Head No Cont (04.30.25 @ 12:54)   IMPRESSION:    Limited by motion.    1. Acute-subacute cortical infarcts LEFT precentral gyrus and LEFT   frontoparietal parasagittal region.  2. Additional findings described in detail above.    Addendum:     Question of additional subcentimeter focus of recent ischemia right   precentral gyrus, however without definitive corresponding ADC or FLAIR   abnormality, possibly artifactual. Corroborate with clinical findings.      CT Brain Stroke Protocol (04.29.25 @ 19:03)   IMPRESSION:  No acute intracranial hemorrhage, mass effect, or midline shift.    CT Angio Neck Stroke Protocol w/ IV Cont (04.29.25 @ 19:21)   IMPRESSION:    CT PERFUSION:  Technical limitations: Motion artifact    Core infarction: 0 ml  Penumbra / tissue at risk for active ischemia: 93 mL    CTA NECK:  Severe bilateral calcified plaque.    CTA HEAD:  The proximal left M1 segment is not well visualized and there is a tangle   of small vessels in this region. Cannot exclude moyamoya syndrome. Distal   vasculature appears fairly symmetrical compared to the right. This is   likely chronic.    TTE W or WO Ultrasound Enhancing Agent (04.30.25 @ 14:20)   CONCLUSIONS:      1. Left ventricular systolic function is normal with an ejection fraction of 66 % by Diehl's method of disks.   2. There is normal LV mass and concentric remodeling.   3. There is mild (grade 1) left ventricular diastolic dysfunction.   4. The right ventricle is not well visualized. probably normal right ventricular cavity size and probably normal right ventricular systolic function.   5. Estimated pulmonary artery systolic pressure is 43 mmHg, consistent with mild pulmonary hypertension.   6. Agitated saline injection was negative for intracardiac shunt.

## 2025-05-01 NOTE — DISCHARGE NOTE PROVIDER - NSDCMRMEDTOKEN_GEN_ALL_CORE_FT
atorvastatin 20 mg oral tablet: 1 tab(s) orally once a day  famotidine 40 mg oral tablet: 1 tab(s) orally once a day  gabapentin 100 mg oral capsule: 1 cap(s) orally once a day (in the morning)  gabapentin 300 mg oral capsule: 1 cap(s) orally once a day (at bedtime)  Lasix 40 mg oral tablet: 1 tab(s) orally once a day  lidocaine 4% topical film: Apply topically to affected area every 12 hours  spironolactone 25 mg oral tablet: 1 tab(s) orally once a day  traZODone 50 mg oral tablet: 1 tab(s) orally once a day (at bedtime)  Tylenol 325 mg oral capsule: 1 cap(s) orally every 6 hours as needed for  mild pain   acetaminophen 325 mg oral tablet: 2 tab(s) orally every 6 hours As needed Temp greater or equal to 38C (100.4F), Mild Pain (1 - 3)  aspirin 81 mg oral delayed release tablet: 1 tab(s) orally once a day  atorvastatin 40 mg oral tablet: 1 tab(s) orally once a day (at bedtime)  clopidogrel 75 mg oral tablet: 1 tab(s) orally once a day  famotidine 20 mg oral tablet: 1 tab(s) orally once a day  lidocaine 4% topical film: Apply topically to affected area once a day  LORazepam 1 mg/mL-NaCl 0.9% intravenous solution: 2 milliliter(s) intravenous once  melatonin 3 mg oral tablet: 1 tab(s) orally once a day (at bedtime)  QUEtiapine 25 mg oral tablet: 1 tab(s) orally once a day (at bedtime)  risperiDONE 1 mg/mL oral solution: 0.5 milliliter(s) orally 2 times a day As needed agitation

## 2025-05-01 NOTE — PROGRESS NOTE ADULT - PROBLEM SELECTOR PLAN 3
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

## 2025-05-01 NOTE — PROGRESS NOTE ADULT - PROBLEM SELECTOR PLAN 2
Patient ambulates with a walker at baseline  Hx of compression fracture in the lumbar region with foot drop   Since yesterday patient has been having difficulty with ambulation  -c/ow right knee pain with mild swelling, f/u Xray   - PT rec RAMESH      ### hx DVT per family  -Pt had hx DVT reported by family but no medication was started.   -will follow up with Doppler US Patient ambulates with a walker at baseline  Hx of compression fracture in the lumbar region with foot drop   Since yesterday patient has been having difficulty with ambulation  -c/ow right knee pain with mild swelling, f/u Xray   - PT rec RAMESH      ### hx DVT per family  -Pt had hx DVT reported by family but no medication was started.   -Doppler US NEG

## 2025-05-01 NOTE — DISCHARGE NOTE PROVIDER - DISCHARGE SERVICE FOR PATIENT
on the discharge service for the patient. I have reviewed and made amendments to the documentation where necessary. Chronic illness

## 2025-05-01 NOTE — DISCHARGE NOTE PROVIDER - NSDCCPCAREPLAN_GEN_ALL_CORE_FT
PRINCIPAL DISCHARGE DIAGNOSIS  Diagnosis: CVA (cerebral vascular accident)  Assessment and Plan of Treatment: You presented to the hospital with complaints of weakness. You were found to have an acute stroke on imaging. You were followed by cardio & neuro. On discharge it is reccommended that you: - recommend ASA 81mg PO daily and Plavix 75mg PO daily X 21 days and then ASA as monotherapy.   - start Atorvastatin 40mg PO daily ( LDL is @ goal <70mg/dl)  A stroke happens when blood flow to part of the brain is stopped. This can cause serious brain damage from a lack of oxygen.   Call your local emergency number (911 in the [place]) or have someone call if: You have any of the following signs of a stroke:  Numbness or drooping on one side of your face. Weakness in an arm or leg. Confusion or difficulty speaking. Dizziness, a severe headache, or vision loss. You have a seizure. You have chest pain or shortness of breath.  Seek care immediately if: Your arm or leg feels warm, tender, and painful. It may look swollen and red. You have loss of balance or coordination. You have double vision or vision loss. You have unusual or heavy bleeding.  Medicines: Medicines may be needed to treat high cholesterol, high blood pressure, or diabetes. You may also need any of the following, depending on the kind of stroke you had. Do not start or stop any other medicines or supplements unless your healthcare provider tells you to. Take your medicine as directed.   Prevent another stroke: Manage health conditions. Do not use nicotine products or illegal drugs. Eat a variety of healthy foods.   Maintain a healthy weight.   Follow up with your doctor or neurologist        SECONDARY DISCHARGE DIAGNOSES  Diagnosis: HTN (hypertension)  Assessment and Plan of Treatment: You have a history of high blood pressure. High blood pressure is a condition that puts you at risk for heart attack, stroke and kidney disease. Please continue to take your medications as prescribed. You can also help control your blood pressure by maintaining a healthy weight, eating a diet low in fat and rich in fruits and vegetables, reduce the amount of salt in your diet. Also, reduce alcohol and try to include some form of physical activity daily for at least 30 mins. Follow up with your medical doctor to establish long term blood pressure treatment goals.  Notify your doctor if you have any of the following symptoms:   Dizziness, Lightheadedness, Blurry vision, Headache, Chest pain, Shortness of breath      Diagnosis: Dementia  Assessment and Plan of Treatment: You have a history of dementia. Dementia is a condition that causes loss of memory, thought control, and judgment. Dementia cannot be cured or prevented, but treatment may slow or reduce your symptoms.  Return to the emergency department if you or someone close to you notices:  You have signs of delirium, such as extreme confusion, and seeing or hearing things that are not there. You become angry or violent, and cannot be calmed down. You faint and cannot be woken.  Take your medicine as directed. Follow- up with your provider.      Diagnosis: Alport syndrome  Assessment and Plan of Treatment: Alport syndrome is a condition that affects your kidneys. Mutations in your collagen genes cause Alport syndrome. Symptoms include blood and protein in your pee and hearing and vision loss. It may also cause kidney failure. Treatment often includes ACE inhibitors and ARBs.  Please follow up with your PCP or nephrologist for further care.    Diagnosis: History of Parkinson disease  Assessment and Plan of Treatment: Parkinson disease (PD) is a long-term movement disorder. The brain cells that control movement start to die and cause changes in how you move, feel, and act. Even though PD may progress and have a severe impact on your daily life, it is not a life-threatening disease.  Call your local emergency number (911 in the [place]) or have someone call if: You feel like hurting yourself or others. You have chest pain or shortness of breath. You become confused, or you have trouble speaking.  Seek care immediately if: You feel lightheaded, dizzy, or faint.  You have weakness in an arm or leg. You have a severe headache or vision changes.  Anti-Parkinson medicines are used to improve movement problems, such as muscle stiffness, twitches, and restlessness. Take your medicine as directed.   Manage your PD: Do not eat foods that are high in protein or dairy. They can cause problems with how some of your medicine works.   Physical therapy is used to help you learn exercises to improve movement and strength, and to decrease pain.   Follow up with your doctor or neurologist    Diagnosis: History of compression fracture of spine  Assessment and Plan of Treatment: Hx of Compression fracture L1, L4-5 with foot drop, take your medications as prescribed.    Diagnosis: Chronic kidney disease (CKD)  Assessment and Plan of Treatment: Avoid taking (NSAIDs) - (ex: Ibuprofen, Advil, Celebrex, Naprosyn)  Avoid taking any nephrotoxic agents (can harm kidneys) - Intravenous contrast for diagnostic testing, combination cold medications.  Have all medications adjusted for your renal function by your Health Care Provider.  Blood pressure control is important.  Take all medication as prescribed.      Diagnosis: HLD (hyperlipidemia)  Assessment and Plan of Treatment: You have a history of hyperlipidemia, which is when you have too much cholesterol in your blood. High amounts of cholesterol in your blood can put you at higher risks for heart attck, strokes and other health problems. Follow up with PCP for treatment goals, continue medication as prescribed, have liver function testing every 3 months as anti lipid medications can cause liver irritation, eat low fat meals, avoid red meat, butter, fried foods and cheese. Get daily exercise.

## 2025-05-01 NOTE — DISCHARGE NOTE PROVIDER - CARE PROVIDER_API CALL
Tere Yao  Internal Medicine  [address] [place] [address]  Phone: [phone]  Fax: [phone]  Established Patient  Follow Up Time: 1 week

## 2025-05-02 LAB
ALBUMIN SERPL ELPH-MCNC: 3.3 G/DL — LOW (ref 3.5–5)
ALP SERPL-CCNC: 82 U/L — SIGNIFICANT CHANGE UP (ref 40–120)
ALT FLD-CCNC: 30 U/L DA — SIGNIFICANT CHANGE UP (ref 10–60)
ANION GAP SERPL CALC-SCNC: 8 MMOL/L — SIGNIFICANT CHANGE UP (ref 5–17)
AST SERPL-CCNC: 23 U/L — SIGNIFICANT CHANGE UP (ref 10–40)
BILIRUB SERPL-MCNC: 0.5 MG/DL — SIGNIFICANT CHANGE UP (ref 0.2–1.2)
BUN SERPL-MCNC: 45 MG/DL — HIGH (ref 7–18)
CALCIUM SERPL-MCNC: 9.2 MG/DL — SIGNIFICANT CHANGE UP (ref 8.4–10.5)
CHLORIDE SERPL-SCNC: 105 MMOL/L — SIGNIFICANT CHANGE UP (ref 96–108)
CO2 SERPL-SCNC: 24 MMOL/L — SIGNIFICANT CHANGE UP (ref 22–31)
CREAT SERPL-MCNC: 1.29 MG/DL — SIGNIFICANT CHANGE UP (ref 0.5–1.3)
EGFR: 40 ML/MIN/1.73M2 — LOW
EGFR: 40 ML/MIN/1.73M2 — LOW
GLUCOSE SERPL-MCNC: 99 MG/DL — SIGNIFICANT CHANGE UP (ref 70–99)
HCT VFR BLD CALC: 39.6 % — SIGNIFICANT CHANGE UP (ref 34.5–45)
HGB BLD-MCNC: 13.2 G/DL — SIGNIFICANT CHANGE UP (ref 11.5–15.5)
MCHC RBC-ENTMCNC: 30.5 PG — SIGNIFICANT CHANGE UP (ref 27–34)
MCHC RBC-ENTMCNC: 33.3 G/DL — SIGNIFICANT CHANGE UP (ref 32–36)
MCV RBC AUTO: 91.5 FL — SIGNIFICANT CHANGE UP (ref 80–100)
NRBC BLD AUTO-RTO: 0 /100 WBCS — SIGNIFICANT CHANGE UP (ref 0–0)
PLATELET # BLD AUTO: 182 K/UL — SIGNIFICANT CHANGE UP (ref 150–400)
POTASSIUM SERPL-MCNC: 4.6 MMOL/L — SIGNIFICANT CHANGE UP (ref 3.5–5.3)
POTASSIUM SERPL-SCNC: 4.6 MMOL/L — SIGNIFICANT CHANGE UP (ref 3.5–5.3)
PROT SERPL-MCNC: 6.8 G/DL — SIGNIFICANT CHANGE UP (ref 6–8.3)
RBC # BLD: 4.33 M/UL — SIGNIFICANT CHANGE UP (ref 3.8–5.2)
RBC # FLD: 13.9 % — SIGNIFICANT CHANGE UP (ref 10.3–14.5)
SODIUM SERPL-SCNC: 137 MMOL/L — SIGNIFICANT CHANGE UP (ref 135–145)
WBC # BLD: 8.96 K/UL — SIGNIFICANT CHANGE UP (ref 3.8–10.5)
WBC # FLD AUTO: 8.96 K/UL — SIGNIFICANT CHANGE UP (ref 3.8–10.5)

## 2025-05-02 PROCEDURE — 99232 SBSQ HOSP IP/OBS MODERATE 35: CPT

## 2025-05-02 RX ORDER — QUETIAPINE FUMARATE 25 MG/1
12.5 TABLET ORAL AT BEDTIME
Refills: 0 | Status: DISCONTINUED | OUTPATIENT
Start: 2025-05-02 | End: 2025-05-04

## 2025-05-02 RX ORDER — MELATONIN 5 MG
3 TABLET ORAL AT BEDTIME
Refills: 0 | Status: DISCONTINUED | OUTPATIENT
Start: 2025-05-02 | End: 2025-05-05

## 2025-05-02 RX ORDER — LORAZEPAM 4 MG/ML
1 VIAL (ML) INJECTION ONCE
Refills: 0 | Status: DISCONTINUED | OUTPATIENT
Start: 2025-05-02 | End: 2025-05-02

## 2025-05-02 RX ORDER — RISPERIDONE 4 MG
0.5 TABLET ORAL
Refills: 0 | Status: DISCONTINUED | OUTPATIENT
Start: 2025-05-02 | End: 2025-05-05

## 2025-05-02 RX ORDER — LORAZEPAM 4 MG/ML
2 VIAL (ML) INJECTION ONCE
Refills: 0 | Status: DISCONTINUED | OUTPATIENT
Start: 2025-05-02 | End: 2025-05-05

## 2025-05-02 RX ORDER — LORAZEPAM 4 MG/ML
0.5 VIAL (ML) INJECTION ONCE
Refills: 0 | Status: DISCONTINUED | OUTPATIENT
Start: 2025-05-02 | End: 2025-05-02

## 2025-05-02 RX ADMIN — ATORVASTATIN CALCIUM 40 MILLIGRAM(S): 80 TABLET, FILM COATED ORAL at 21:59

## 2025-05-02 RX ADMIN — Medication 650 MILLIGRAM(S): at 02:19

## 2025-05-02 RX ADMIN — Medication 0.5 MILLIGRAM(S): at 13:05

## 2025-05-02 RX ADMIN — HEPARIN SODIUM 5000 UNIT(S): 1000 INJECTION INTRAVENOUS; SUBCUTANEOUS at 05:54

## 2025-05-02 RX ADMIN — Medication 81 MILLIGRAM(S): at 16:26

## 2025-05-02 RX ADMIN — CLOPIDOGREL BISULFATE 75 MILLIGRAM(S): 75 TABLET, FILM COATED ORAL at 16:26

## 2025-05-02 RX ADMIN — LIDOCAINE HYDROCHLORIDE 1 PATCH: 20 JELLY TOPICAL at 16:27

## 2025-05-02 RX ADMIN — Medication 20 MILLIGRAM(S): at 16:27

## 2025-05-02 RX ADMIN — Medication 3 MILLIGRAM(S): at 21:59

## 2025-05-02 RX ADMIN — LIDOCAINE HYDROCHLORIDE 1 PATCH: 20 JELLY TOPICAL at 19:23

## 2025-05-02 RX ADMIN — Medication 650 MILLIGRAM(S): at 03:19

## 2025-05-02 RX ADMIN — HEPARIN SODIUM 5000 UNIT(S): 1000 INJECTION INTRAVENOUS; SUBCUTANEOUS at 18:35

## 2025-05-02 RX ADMIN — QUETIAPINE FUMARATE 12.5 MILLIGRAM(S): 25 TABLET ORAL at 21:59

## 2025-05-02 RX ADMIN — Medication 1 MILLIGRAM(S): at 12:19

## 2025-05-02 NOTE — PROGRESS NOTE ADULT - PROBLEM SELECTOR PLAN 9
Hx of HLD on Atorvastatin 20mg   Trig 200, HDL 45, LDL 61  - c/w Atorvastatin 40mg QD

## 2025-05-02 NOTE — PROGRESS NOTE ADULT - PROBLEM SELECTOR PLAN 4
Hx of Parkinson disease not on med  Per daughter, patient primary care physician told her not to start any medication.   Per daughter patient has not seen a neurologist   PE: resting tremor noted   - consult Neuro dr. Mills following     ###Agitation   -pt used to have seroquel 25mg qhs last year at rehab  -start prn seroquel 12.5g BID for increased agitation  -reorient pt  -will need pre med prior to MR
Hx of Parkinson disease not on med  Per daughter, patient primary care physician told her not to start any medication.   Per daughter patient has not seen a neurologist   PE: resting tremor noted   - consult Neuro dr. Mills following     ###Agitation   -pt used to have seroquel 25mg qhs last year at rehab  -start prn seroquel 12.5g BID for increased agitation  -reorient pt
Hx of Parkinson disease not on med  Per daughter, patient primary care physician told her not to start any medication.   Per daughter patient has not seen a neurologist   PE: resting tremor noted   - consult Neuro dr. Mills following     ###Agitation   -pt used to have seroquel 25mg qhs last year at rehab  -start prn seroquel 12.5g BID for increased agitation  -reorient pt

## 2025-05-02 NOTE — PROGRESS NOTE ADULT - PROBLEM SELECTOR PLAN 8
Hx of HTN on Lasix 40mg, Spirolactone 25mg    - Hold in the setting of permissive HTN

## 2025-05-02 NOTE — PROGRESS NOTE ADULT - NS ATTEND AMEND GEN_ALL_CORE FT
Patient seen at bedside with daughter providing interpretation, states she feels okay but still has R weakness.  On exam, patient is  NAD, cardiopulmonary exams unremarkable, abdomen soft, NT/ND, extremities without edema. UE and LE strength L>R.  Labs reviewed, CBC unremarkable, BMP with normal Cr with Cr 1.44, LFT unremarkable, A1c 6.4    Assessment and plan:  89 year old female patient with pmhx Dementia ( AAOX1-2 - self), Alport disease (carrier),  HTN, HLD, Scoliosis, Compression fracture L1, L4-5 with foot drop, Parkinson disease, PVD, lumbar spondylolisthesis who presented to the ER due to right sided weakness with slurred speech. Further evaluation with MRI brain showing L ischemic stroke.    # Acute-subacute cortical infarcts in LEFT precentral gyrus and LEFT frontoparietal parasagittal region  # Dementia  # HTN  # HLD  # Scoliosis  # Hx of L1 fracture  # Pakinson disease  # PVD  # Lumbar spondylolisthesis with foot drop  # Hx of DVT?    - neuro recs appreciated, continue treatment with aspirin and plavix for 21 days, start atorvastatin 40mg  - obtain TTE  - telemetry monitoring  - PT evaluation  - continue home famotidine  - daughter reports pt had DVT last year, however medication list she brings does not contain systemic AC and she does not remember if she is actively taking any. Obtain formal med rec. In the meantime, continue DVT ppx with heparin SQ  - the med list also does not contain Sinemet, likely pt is off the med
Patient seen at bedside, states she feels good.  On exam, cardiopulmonary exams unremarkable, abdomen soft, NT/ND, extremities without edema.  Labs reviewed, CBC unremarkable.    Assessment and plan:  89 year old female patient with pmhx Dementia ( AAOX1-2 - self), Alport disease (carrier),  HTN, HLD, Scoliosis, Compression fracture L1, L4-5 with foot drop, Parkinson disease, PVD, lumbar spondylolisthesis who presented to the ER due to right sided weakness with slurred speech. Further evaluation with MRI brain showing L ischemic stroke.    # Acute-subacute cortical infarcts in LEFT precentral gyrus and LEFT frontoparietal parasagittal region  # Dementia  # HTN  # HLD  # Scoliosis  # Hx of L1 fracture  # Pakinson disease  # PVD  # Lumbar spondylolisthesis with foot drop    - neuro recs appreciated, continue treatment with aspirin and plavix for 21 days, start atorvastatin 40mg  - attempted to repeat MRI brain as per neuro recs but patient was restless even with ativan 1.5mg IV in total, will attempt again while in house  - sleep cycle disturbance could potentially affect the restlessness, start Seroquel 12.5mg qhs. No overt Parkinsonism and she is off Parkisnon meds at home, thus would be safe if started with small dose with close monitoring  - TTE without thrombus or shunt  - telemetry monitoring  - PT evaluation appreciated, RAMESH recommended  - continue home famotidine
Patient seen at bedside, somnolent.  On exam, cardiopulmonary exams unremarkable, abdomen soft, NT/ND, extremities without edema.  Labs reviewed, CBC and BMP unremarkable.    Assessment and plan:  89 year old female patient with pmhx Dementia ( AAOX1-2 - self), Alport disease (carrier),  HTN, HLD, Scoliosis, Compression fracture L1, L4-5 with foot drop, Parkinson disease, PVD, lumbar spondylolisthesis who presented to the ER due to right sided weakness with slurred speech. Further evaluation with MRI brain showing L ischemic stroke.    # Acute-subacute cortical infarcts in LEFT precentral gyrus and LEFT frontoparietal parasagittal region  # Dementia  # HTN  # HLD  # Scoliosis  # Hx of L1 fracture  # Pakinson disease  # PVD  # Lumbar spondylolisthesis with foot drop    - neuro recs appreciated, continue treatment with aspirin and plavix for 21 days, start atorvastatin 40mg  - further prelim recs from neuro is to repeat MRI brain  - TTE without thrombus or shunt  - telemetry monitoring  - PT evaluation appreciated, RAMESH recommended  - continue home famotidine  - daughter later reports that the potential DVT pt was found to have last year was bleeding and not a blood clot. Doppler without DVT this time, no systemic AC indicated

## 2025-05-02 NOTE — PROGRESS NOTE ADULT - SUBJECTIVE AND OBJECTIVE BOX
NP Note discussed with  Primary Attending    INTERVAL HPI/OVERNIGHT EVENTS: Pt has no acute overnight event. Confused at baseline. s/p Ativan total 1.5mg in divided doses for MR brain, however unsuccessful due to increased agitation.     MEDICATIONS  (STANDING):  aspirin enteric coated 81 milliGRAM(s) Oral daily  atorvastatin 40 milliGRAM(s) Oral at bedtime  clopidogrel Tablet 75 milliGRAM(s) Oral daily  famotidine    Tablet 20 milliGRAM(s) Oral daily  heparin   Injectable 5000 Unit(s) SubCutaneous every 12 hours  lidocaine   4% Patch 1 Patch Transdermal daily    MEDICATIONS  (PRN):  acetaminophen     Tablet .. 650 milliGRAM(s) Oral every 6 hours PRN Temp greater or equal to 38C (100.4F), Mild Pain (1 - 3)  ondansetron Injectable 4 milliGRAM(s) IV Push every 8 hours PRN Nausea and/or Vomiting  QUEtiapine 12.5 milliGRAM(s) Oral every 12 hours PRN agitation      __________________________________________________  REVIEW OF SYSTEMS:  unable to assess ROS due to dementia       Vital Signs Last 24 Hrs  T(C): 36.8 (02 May 2025 16:06), Max: 36.9 (02 May 2025 08:35)  T(F): 98.2 (02 May 2025 16:06), Max: 98.4 (02 May 2025 08:35)  HR: 86 (02 May 2025 16:06) (66 - 86)  BP: 116/74 (02 May 2025 16:06) (101/58 - 164/79)  BP(mean): 89 (02 May 2025 04:55) (77 - 89)  RR: 18 (02 May 2025 16:06) (18 - 118)  SpO2: 97% (02 May 2025 16:06) (94% - 99%)    Parameters below as of 02 May 2025 16:06  Patient On (Oxygen Delivery Method): room air        ________________________________________________  PHYSICAL EXAM:  GENERAL: NAD  HEENT: Normocephalic;  conjunctivae and sclerae clear; moist mucous membranes;   NECK : supple  CHEST/LUNG: Clear to auscultation bilaterally with fair air entry   HEART: S1 S2  regular; no murmurs, gallops or rubs  ABDOMEN: Soft, Nontender, Nondistended; Bowel sounds present  EXTREMITIES: no cyanosis; no edema; no calf tenderness  Musk: Left leg limited ROM due to pain.   SKIN: warm and dry; no rash  NERVOUS SYSTEM:  Awake and alert; Oriented  to self ; no new deficits    _________________________________________________  LABS:                        13.2   8.96  )-----------( 182      ( 02 May 2025 06:05 )             39.6     05-02    137  |  105  |  45[H]  ----------------------------<  99  4.6   |  24  |  1.29    Ca    9.2      02 May 2025 06:05    TPro  6.8  /  Alb  3.3[L]  /  TBili  0.5  /  DBili  x   /  AST  23  /  ALT  30  /  AlkPhos  82  05-02      Urinalysis Basic - ( 02 May 2025 06:05 )    Color: x / Appearance: x / SG: x / pH: x  Gluc: 99 mg/dL / Ketone: x  / Bili: x / Urobili: x   Blood: x / Protein: x / Nitrite: x   Leuk Esterase: x / RBC: x / WBC x   Sq Epi: x / Non Sq Epi: x / Bacteria: x      CAPILLARY BLOOD GLUCOSE    RADIOLOGY & ADDITIONAL TESTS:    Imaging  Reviewed:  YES  < from: CT Angio Neck Stroke Protocol w/ IV Cont (04.29.25 @ 19:21) >    ACC: [id number] EXAM:  CT ANGIO BRAIN STROKE PROTC IC   ORDERED BY:   KEATON ULLOA     ACC: [id number] EXAM:  CT ANGIO NECK STROKE PROTCL IC   ORDERED BY:   KEATON ULLOA     ACC: [id number] EXAM:  CT BRAIN PERFUSION MAPS STROKE   ORDERED BY:   KEATON ULLOA     PROCEDURE DATE:  04/29/2025          INTERPRETATION:  CLINICAL INFORMATION: Stroke Code, BIBA from home as per   daughter right arm weakness and slurred speech since 9am but getting   worse during the day. Hx Dementia      COMPARISON: None.    CONTRAST:  IV Contrast: Omnipaque 350  130 cc administered  70 cc discarded    TECHNIQUE: CT perfusion and CTA of the head and neck were performed   following the intravenous administration of IV contrast. MIP   reconstructions were performed on a separate workstation and reviewed.   RAPID software was utilized for perfusion analysis.    FINDINGS:    CT PERFUSION:    TECHNICAL LIMITATIONS: Motion artifact    CBF<30%/CORE INFARCTION: 0 mL  TMAX>6s/UNDERPERFUSED TISSUE: 93 mL  MISMATCH VOLUME/TISSUE AT RISK: 93 mL  MISMATCH RATIO: Infinite    MISCELLANEOUS: None.      CTA NECK:    AORTIC ARCH AND VISUALIZED GREAT VESSELS: Within normal limits.    RIGHT:  COMMON CAROTID ARTERY: Severe calcified plaque at the carotid bifurcation.  INTERNAL CAROTID ARTERY: Suspected severe stenosis based on NASCET   criteria.  VERTEBRAL ARTERY: Normal in course and caliber to the intracranial   circulation. Dominant.    LEFT:  COMMON CAROTID ARTERY: Severe calcified plaque at the carotid bifurcation.  INTERNAL CAROTID ARTERY: Suspected severe stenosis based on NASCET   criteria.  VERTEBRAL ARTERY: Normal in course and caliber to the intracranial   circulation.    VISUALIZED LUNGS: Clear.    MISCELLANEOUS: None.    CAROTID STENOSIS REFERENCE: Percent (%) stenosis is expressed in terms of   NASCET Criteria. (NASCET = 100x1-(N/D)). N=greatest narrowing. D=normal   distal diameter - MILD = <50% stenosis. - MODERATE = 50-69% stenosis. -   SEVERE = 70-89% stenosis. - HAIRLINE/CRITICAL = 90-99% stenosis. -   OCCLUDED = 100% stenosis.      CTA HEAD:    INTERNAL CAROTID ARTERIES: Bilateral petrous, precavernous, cavernous,   and supraclinoid regions are patent without significant stenosis.    [place] OF DEGROOT: No aneurysm identified. Tiny aneurysms can bebeyond   the resolution of CTA technique.    ANTERIOR CEREBRAL ARTERIES: No significant stenosis or occlusion.  MIDDLE CEREBRAL ARTERIES: The proximal left M1 segment is not well   visualized and there is a tangle of small vessels in this region. Cannot   exclude moyamoya syndrome. Distal vasculature appears fairly symmetrical   compared to the right.  POSTERIOR CEREBRAL ARTERIES: No significant stenosis or occlusion.    DISTAL VERTEBRAL / BASILAR ARTERIES: Vascular calcifications in the right   vertebral artery.    VENOUS STRUCTURES: Visualized superficial and deep venous structures   appear patent.    MISCELLANEOUS: No other vascular abnormality is seen.      IMPRESSION:    CT PERFUSION:  Technical limitations: Motion artifact    Core infarction: 0 ml  Penumbra / tissue at risk for active ischemia: 93 mL    CTA NECK:  Severe bilateral calcified plaque.    CTA HEAD:  The proximal left M1 segment is not well visualized and there is a tangle   of small vessels in this region. Cannot exclude moyamoya syndrome. Distal   vasculature appears fairly symmetrical compared to the right. This is   likely chronic.    Findings were discussed with Dr. KEATON ULLOA [phone] 4/29/2025 7:43   PM by Dr. Christopher with read back confirmation.    --- End of Report ---            MARISELA CHRISTOPHER MD; Attending Radiologist  This document has been electronically signed. Apr 29 2025  8:06PM    < end of copied text >    < from: CT Brain Stroke Protocol (04.29.25 @ 19:03) >  IMPRESSION:  No acute intracranial hemorrhage, mass effect, or midline shift.    Findings were discussed with Dr. KEATON ULLOA [phone] 4/29/2025 7:21   PM by Dr. Christopher with read back confirmation.    < end of copied text >    < from: MR Head No Cont (04.30.25 @ 12:54) >  IMPRESSION:    Limited by motion.    1. Acute-subacute cortical infarcts LEFT precentral gyrus and LEFT   frontoparietal parasagittal region.  2. Additional findings described in detail above.    --- End of Report ---      < end of copied text >    Consultant(s) Notes Reviewed:   YES      Plan of care was discussed with patient and /or primary care giver; all questions and concerns were addressed

## 2025-05-02 NOTE — PROGRESS NOTE ADULT - PROBLEM SELECTOR PLAN 1
BIBEMS for right sided weakness and altered mental status  NIHSS 14 , Tele stroke consulted by the ED  -UA negative, lactate normalized  -CXR: Lower lobe infiltrate/atelectasis   -CT Stroke protocol: Motion artifact, Penumbra / tissue at risk for active ischemia, Severe bilateral calcified plaque in the neck. The proximal left M1 segment is not well visualized and there is a tangle of small vessels in this region.   -Dysphagia screen: Passed   > Patient is out of window for TNK w/ LKW 11pm last night  > Deemed not a candidate for mechanical thrombectomy in setting of negative CTA w/ no clear acute LVO on imaging, age , poor baseline function and LKW >23hours ago.  - Admit to Telemetry and Neuro checks q4, BP control  - PT rec RAMESH   -Consult Neuro dr. Mills following  -MR head result-Acute infarcts as above.   -started ASA 81mg PO daily and Plavix 75mg PO daily X 21 days  and then ASA as monotherapy and lipitor 40mg QD per neurology  -f/u repeat MR head to re evaluate Suspected hyper acute right precentral gyrus hemisphere infarct vs artifact.  -IF second MR is positive, then will need MANISHA/ILR per neurology, Family agreed.   -unable MR today 5/2 due to agitation, will premedicate with ATIVAN 2mg next attempt as d/w attending

## 2025-05-02 NOTE — PROGRESS NOTE ADULT - NS ATTEND BILL GEN_ALL_CORE
He can reschedule the amalia, and check the safety measures before he goes for the future amalia.   
Attending to bill

## 2025-05-02 NOTE — PROGRESS NOTE ADULT - PROBLEM SELECTOR PLAN 2
Patient ambulates with a walker at baseline  Hx of compression fracture in the lumbar region with foot drop   Since yesterday patient has been having difficulty with ambulation  -c/ow right knee pain with mild swelling  - Xray with chronic OA, patellar enthesopathy, no Fx, minimal fluid in suprapatellar bursa. atherosclerosis  -c/w pain med with tylenol and lidocaine patch   - PT rec RAMESH      ### hx DVT per family  -Pt had hx DVT reported by family but no medication was started, no actual clot, daughter states it was bleeding.   -Doppler without DVT this time, no systemic AC indicated.

## 2025-05-02 NOTE — PROGRESS NOTE ADULT - PROBLEM SELECTOR PLAN 6
p/w BUN/ SCr 56/1.40  on admission  Baseline SCr  1.21 as per outpatient record (4/23/25)  - Avoid Nephrotoxic agent.  - Monitor BMP

## 2025-05-02 NOTE — PROGRESS NOTE ADULT - PROBLEM SELECTOR PLAN 5
Hx of Compression fracture L1, L4-5 with foot drop,   currently on Gabapentin 100mg QD, Gabapentin 300mg QHS   - Hold med for now   - Start Tylenol and lidocaine patch  for pain

## 2025-05-02 NOTE — PROGRESS NOTE ADULT - PROBLEM SELECTOR PROBLEM 5
History of compression fracture of spine

## 2025-05-02 NOTE — PROGRESS NOTE ADULT - PROBLEM SELECTOR PLAN 10
DVT ppx: Heparin    ##Discharge planning  From home, daughter is a caregiver  PT recs RAMESH-accepted at [facility] no auth needed  f/u repeat MRI, failed 5/2 due to agitation, will give 2mg Ativan next attempt, daughter agreed

## 2025-05-02 NOTE — PROGRESS NOTE ADULT - PROBLEM SELECTOR PLAN 7
Hx of Dementia   Baseline AAOx1 -2 ( Self)  -will give prn seroquel 12.5mg BID for increased agitaiton  -s/p 1mg ativan for MR (4/30) Hx of Dementia   Baseline AAOx1 -2 ( Self)  -started seroquel 12.5mg qhs and prn risperidone for increased agitation  -s/p ativan for MR (had 4/30, 5/2)

## 2025-05-03 PROCEDURE — 99232 SBSQ HOSP IP/OBS MODERATE 35: CPT

## 2025-05-03 RX ADMIN — CLOPIDOGREL BISULFATE 75 MILLIGRAM(S): 75 TABLET, FILM COATED ORAL at 11:32

## 2025-05-03 RX ADMIN — Medication 650 MILLIGRAM(S): at 02:33

## 2025-05-03 RX ADMIN — ATORVASTATIN CALCIUM 40 MILLIGRAM(S): 80 TABLET, FILM COATED ORAL at 21:28

## 2025-05-03 RX ADMIN — LIDOCAINE HYDROCHLORIDE 1 PATCH: 20 JELLY TOPICAL at 04:15

## 2025-05-03 RX ADMIN — Medication 650 MILLIGRAM(S): at 01:33

## 2025-05-03 RX ADMIN — LIDOCAINE HYDROCHLORIDE 1 PATCH: 20 JELLY TOPICAL at 21:13

## 2025-05-03 RX ADMIN — Medication 20 MILLIGRAM(S): at 11:32

## 2025-05-03 RX ADMIN — Medication 3 MILLIGRAM(S): at 21:28

## 2025-05-03 RX ADMIN — QUETIAPINE FUMARATE 12.5 MILLIGRAM(S): 25 TABLET ORAL at 21:28

## 2025-05-03 RX ADMIN — HEPARIN SODIUM 5000 UNIT(S): 1000 INJECTION INTRAVENOUS; SUBCUTANEOUS at 05:49

## 2025-05-03 RX ADMIN — LIDOCAINE HYDROCHLORIDE 1 PATCH: 20 JELLY TOPICAL at 11:32

## 2025-05-03 RX ADMIN — HEPARIN SODIUM 5000 UNIT(S): 1000 INJECTION INTRAVENOUS; SUBCUTANEOUS at 17:34

## 2025-05-03 RX ADMIN — Medication 81 MILLIGRAM(S): at 11:32

## 2025-05-03 NOTE — PROGRESS NOTE ADULT - SUBJECTIVE AND OBJECTIVE BOX
[facility] Medicine  Patient is a 89y old  Female who presents with a chief complaint of CVA (02 May 2025 16:10)      SUBJECTIVE / OVERNIGHT EVENTS:  Patient seen at bedside, states she feels good, no acute symptoms noted.     MEDICATIONS  (STANDING):  aspirin enteric coated 81 milliGRAM(s) Oral daily  atorvastatin 40 milliGRAM(s) Oral at bedtime  clopidogrel Tablet 75 milliGRAM(s) Oral daily  famotidine    Tablet 20 milliGRAM(s) Oral daily  heparin   Injectable 5000 Unit(s) SubCutaneous every 12 hours  lidocaine   4% Patch 1 Patch Transdermal daily  LORazepam   Injectable 2 milliGRAM(s) IV Push once  melatonin 3 milliGRAM(s) Oral at bedtime  QUEtiapine 12.5 milliGRAM(s) Oral at bedtime    MEDICATIONS  (PRN):  acetaminophen     Tablet .. 650 milliGRAM(s) Oral every 6 hours PRN Temp greater or equal to 38C (100.4F), Mild Pain (1 - 3)  ondansetron Injectable 4 milliGRAM(s) IV Push every 8 hours PRN Nausea and/or Vomiting  risperiDONE   Solution 0.5 milliGRAM(s) Oral two times a day PRN agitation          OBJECTIVE:  Vital Signs Last 24 Hrs  T(C): 36.5 (03 May 2025 08:21), Max: 36.8 (02 May 2025 16:06)  T(F): 97.7 (03 May 2025 08:21), Max: 98.2 (02 May 2025 16:06)  HR: 71 (03 May 2025 08:21) (71 - 86)  BP: 120/72 (03 May 2025 08:21) (116/74 - 140/94)  BP(mean): --  RR: 18 (03 May 2025 08:21) (18 - 18)  SpO2: 99% (03 May 2025 08:21) (94% - 99%)    Parameters below as of 03 May 2025 08:21  Patient On (Oxygen Delivery Method): room air        PHYSICAL EXAM:  GENERAL: NAD  HEAD:  Atraumatic, Normocephalic  EYES: conjunctiva and sclera clear  NECK: Supple, No JVD  CHEST/LUNG: Clear to auscultation bilaterally; No wheeze  HEART: Regular rate and rhythm; No murmurs, rubs, or gallops  ABDOMEN: Soft, Nontender, Nondistended; Bowel sounds present  EXTREMITIES:  No clubbing, cyanosis, or edema  SKIN: No rashes or lesions      CAPILLARY BLOOD GLUCOSE        I&O's Summary    02 May 2025 07:01  -  03 May 2025 07:00  --------------------------------------------------------  IN: 0 mL / OUT: 1000 mL / NET: -1000 mL              LABS:                        13.2   8.96  )-----------( 182      ( 02 May 2025 06:05 )             39.6     05-02    137  |  105  |  45[H]  ----------------------------<  99  4.6   |  24  |  1.29    Ca    9.2      02 May 2025 06:05    TPro  6.8  /  Alb  3.3[L]  /  TBili  0.5  /  DBili  x   /  AST  23  /  ALT  30  /  AlkPhos  82  05-02          Urinalysis Basic - ( 02 May 2025 06:05 )    Color: x / Appearance: x / SG: x / pH: x  Gluc: 99 mg/dL / Ketone: x  / Bili: x / Urobili: x   Blood: x / Protein: x / Nitrite: x   Leuk Esterase: x / RBC: x / WBC x   Sq Epi: x / Non Sq Epi: x / Bacteria: x            RADIOLOGY & ADDITIONAL TESTS:

## 2025-05-03 NOTE — PHARMACOTHERAPY INTERVENTION NOTE - COMMENTS
Patient identified by [facility] for Patients 66 y/o and Older Report.     Lorazepam x 1 - MRI    No intervention at this time.

## 2025-05-04 LAB
CULTURE RESULTS: SIGNIFICANT CHANGE UP
CULTURE RESULTS: SIGNIFICANT CHANGE UP
GLUCOSE BLDC GLUCOMTR-MCNC: 95 MG/DL — SIGNIFICANT CHANGE UP (ref 70–99)
SPECIMEN SOURCE: SIGNIFICANT CHANGE UP
SPECIMEN SOURCE: SIGNIFICANT CHANGE UP

## 2025-05-04 PROCEDURE — 99232 SBSQ HOSP IP/OBS MODERATE 35: CPT

## 2025-05-04 PROCEDURE — 93010 ELECTROCARDIOGRAM REPORT: CPT

## 2025-05-04 RX ORDER — QUETIAPINE FUMARATE 25 MG/1
25 TABLET ORAL AT BEDTIME
Refills: 0 | Status: DISCONTINUED | OUTPATIENT
Start: 2025-05-04 | End: 2025-05-05

## 2025-05-04 RX ORDER — OLANZAPINE 10 MG/1
2.5 TABLET ORAL ONCE
Refills: 0 | Status: COMPLETED | OUTPATIENT
Start: 2025-05-04 | End: 2025-05-04

## 2025-05-04 RX ADMIN — HEPARIN SODIUM 5000 UNIT(S): 1000 INJECTION INTRAVENOUS; SUBCUTANEOUS at 05:44

## 2025-05-04 RX ADMIN — Medication 81 MILLIGRAM(S): at 12:21

## 2025-05-04 RX ADMIN — LIDOCAINE HYDROCHLORIDE 1 PATCH: 20 JELLY TOPICAL at 12:22

## 2025-05-04 RX ADMIN — Medication 20 MILLIGRAM(S): at 12:21

## 2025-05-04 RX ADMIN — LIDOCAINE HYDROCHLORIDE 1 PATCH: 20 JELLY TOPICAL at 20:37

## 2025-05-04 RX ADMIN — HEPARIN SODIUM 5000 UNIT(S): 1000 INJECTION INTRAVENOUS; SUBCUTANEOUS at 17:39

## 2025-05-04 RX ADMIN — QUETIAPINE FUMARATE 25 MILLIGRAM(S): 25 TABLET ORAL at 21:16

## 2025-05-04 RX ADMIN — Medication 3 MILLIGRAM(S): at 21:16

## 2025-05-04 RX ADMIN — ATORVASTATIN CALCIUM 40 MILLIGRAM(S): 80 TABLET, FILM COATED ORAL at 21:16

## 2025-05-04 RX ADMIN — LIDOCAINE HYDROCHLORIDE 1 PATCH: 20 JELLY TOPICAL at 05:33

## 2025-05-04 RX ADMIN — OLANZAPINE 2.5 MILLIGRAM(S): 10 TABLET ORAL at 02:25

## 2025-05-04 RX ADMIN — CLOPIDOGREL BISULFATE 75 MILLIGRAM(S): 75 TABLET, FILM COATED ORAL at 12:21

## 2025-05-04 NOTE — PROGRESS NOTE ADULT - SUBJECTIVE AND OBJECTIVE BOX
[facility] Medicine  Patient is a 89y old  Female who presents with a chief complaint of CVA (03 May 2025 12:30)      SUBJECTIVE / OVERNIGHT EVENTS:  Patient had an episode of agitation yesterday.  Patient seen at bedside, states she feels good, no acute symptoms noted. Daughter at bedside, states that pt was likely agitated because no family member was with her last night    MEDICATIONS  (STANDING):  aspirin enteric coated 81 milliGRAM(s) Oral daily  atorvastatin 40 milliGRAM(s) Oral at bedtime  clopidogrel Tablet 75 milliGRAM(s) Oral daily  famotidine    Tablet 20 milliGRAM(s) Oral daily  heparin   Injectable 5000 Unit(s) SubCutaneous every 12 hours  lidocaine   4% Patch 1 Patch Transdermal daily  LORazepam   Injectable 2 milliGRAM(s) IV Push once  melatonin 3 milliGRAM(s) Oral at bedtime  QUEtiapine 25 milliGRAM(s) Oral at bedtime    MEDICATIONS  (PRN):  acetaminophen     Tablet .. 650 milliGRAM(s) Oral every 6 hours PRN Temp greater or equal to 38C (100.4F), Mild Pain (1 - 3)  ondansetron Injectable 4 milliGRAM(s) IV Push every 8 hours PRN Nausea and/or Vomiting  risperiDONE   Solution 0.5 milliGRAM(s) Oral two times a day PRN agitation          OBJECTIVE:  Vital Signs Last 24 Hrs  T(C): 36.8 (04 May 2025 11:51), Max: 37 (04 May 2025 00:03)  T(F): 98.2 (04 May 2025 11:51), Max: 98.6 (04 May 2025 00:03)  HR: 64 (04 May 2025 11:51) (64 - 101)  BP: 132/61 (04 May 2025 11:51) (114/97 - 142/74)  BP(mean): --  RR: 18 (04 May 2025 11:51) (18 - 18)  SpO2: 96% (04 May 2025 11:51) (93% - 98%)    Parameters below as of 04 May 2025 11:51  Patient On (Oxygen Delivery Method): room air        PHYSICAL EXAM:  GENERAL: NAD,  HEAD:  Atraumatic, Normocephalic  EYES: conjunctiva and sclera clear  NECK: Supple, No JVD  CHEST/LUNG: Clear to auscultation bilaterally; No wheeze  HEART: Regular rate and rhythm; No murmurs, rubs, or gallops  ABDOMEN: Soft, Nontender, Nondistended; Bowel sounds present  EXTREMITIES:  No clubbing, cyanosis, or edema  PSYCH: [place]  SKIN: No rashes or lesions      CAPILLARY BLOOD GLUCOSE        I&O's Summary    03 May 2025 07:01  -  04 May 2025 07:00  --------------------------------------------------------  IN: 300 mL / OUT: 0 mL / NET: 300 mL              LABS:                        RADIOLOGY & ADDITIONAL TESTS:

## 2025-05-04 NOTE — PROGRESS NOTE ADULT - ASSESSMENT
Assessment and Plan:     Assessment :  89y Ambidextruous/ L hand dominant  Female with PMHx of  Dementia, Alport syndrome, HTN, HLD, Lumbar compression Fx, PD presented to [facility] ED on 4/29 for eval of Right sided weakness. Pt has not been feeling well since Monday, with recent back, arms and leg pain, restarted on Gabapentin per PCPs advice, went to bed on Monday night and woke up yesterday morning, noted to have R sided weakness, lethargic and some transient slurred speech. Initial Non con CTH w/ no acute intracranial pathology, R parietal lobe chronic infarct, CTA H/N w/ extracranial severe stenosis 2/2 severe calcified plaque and LM1 Moyamoya appearance w/tangle of vessels and w/distal symmetry which could possibly be from chronic occlusion. Pt not a candidate for any acute stroke intervention. Pt admitted to Tele for further monitoring and R/O stroke, Neuro consulted for the same.       Impression: Acute R sided weakness w/Neuroimaging evidence of Acute/subacute cortical infarcts in L precentral gyrus and L frontoparietal parasaggital region and a ? area of hyperintensity in R Precentral gyrus which could be artifact Vs recent infarct, Neuro attending Dr. Mills had extensive D/W pts daughter @ bedside about cardioembolic W/U given suspicion for B/L Hemispheric infarcts and family agrees for further w/u and Rpt MRI to start with. Likely mechanism also may include Large vessel athero given B/L ICA severe plaque and LM1 moyamoya appearance of vessel which could be from chronic occlusion.       Reccs:  1)Secondary stroke prevention    - C/W ASA 81mg PO daily and Plavix 75mg PO daily X 21 days  and then ASA as monotherapy.   - start Atorvastatin 40mg PO daily ( LDL is @ goal <70mg/dl)    2) Stroke risk factors  - A1C: 6.4  - LDL: 61  - HTN    3) Further management  - MRI brain without : done and Acute infarcts as above.   - Obtain Rpt MRI Brain Non con to further eval the ? R precentral gyrus hyperintensity. If +ve, pt may need further cardioembolic W/U (MANISHA and ILR after D/W family).   - TTE done, results as above.   - recommend SBP goal <180,permissive Hypertension X initial 24-48hrs and then gradual Normotension.   - recommend q4hr stroke neuro checks  - Tele monitoring to monitor for Afib or arrhythmia.   - may need outpt neurology follow up  - provide stroke education  - PT eval and Reccs appreciated.       Informed Primary team LENKA. Luisa    Pt seen, examined and D/W Neuro attending Dr. Mills. 
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 Further evaluation with MRI brain showing L ischemic stroke.    # Acute-subacute cortical infarcts in LEFT precentral gyrus and LEFT frontoparietal parasagittal region  # Dementia  # HTN  # HLD  # Scoliosis  # Hx of L1 fracture  # Pakinson disease  # PVD  # Lumbar spondylolisthesis with foot drop    - neuro recs appreciated, continue treatment with aspirin and plavix for 21 days, start atorvastatin 40mg  - attempted to repeat MRI brain as per neuro recs but patient was restless even with ativan 1.5mg IV in total, will attempt again while in house  - continue Seroquel 12.5mg qhs  - TTE without thrombus or shunt  - telemetry monitoring  - PT evaluation appreciated, RAMESH recommended  - continue home famotidine  - DVT ppx: heparin SQ
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 Further evaluation with MRI brain showing L ischemic stroke.    # Acute-subacute cortical infarcts in LEFT precentral gyrus and LEFT frontoparietal parasagittal region  # Dementia  # HTN  # HLD  # Scoliosis  # Hx of L1 fracture  # Pakinson disease  # PVD  # Lumbar spondylolisthesis with foot drop    - neuro recs appreciated, continue treatment with aspirin and plavix for 21 days, start atorvastatin 40mg  - attempted to repeat MRI brain as per neuro recs but patient was restless even with ativan 1.5mg IV in total, will attempt again while in house  - had agitation despite Seroquel 12.5mg qhs, will increase dose to 25mg  - no hand tremor noted on exam, Parkinsonism not worsening on seroquel  - TTE without thrombus or shunt  - telemetry monitoring  - PT evaluation appreciated, RAMESH recommended  - continue home famotidine  - DVT ppx: heparin SQ
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   Pt is admitted to telemetry for CVA, ambulatory dysfunction. Neurology consulted.  
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   Pt is admitted to telemetry for CVA, ambulatory dysfunction. Neurology consulted.  
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   Pt is admitted to telemetry for CVA, ambulatory dysfunction. Neurology consulted.

## 2025-05-04 NOTE — CHART NOTE - NSCHARTNOTEFT_GEN_A_CORE
EVENT:   5/4/25, 2am, patient with hx. advanced dementia, became confused, agitated and very noisy. Disturbing another patients. She refused to take any po medications.   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 Collateral information obtained from daughter at bedside, As per daughter, patient  was complaining of lower back pain yesterday. Patient was prescribed Gabapentin ( 100mg in the morning  and 300mg at night) by PCP. Patient received a day dose of the medication. Per daughter, patient went to sleep at approximately 11 PM last night and got up today at approximately  9 AM.  Per daughter, patient  appeared very weak in the morning and started complaining of severe back pain.  So she gave the  patient Gabapentin 100mg. Few minutes later patient felt nauseous and vomited ( 1 episode). At  approximately 12pm, patient appeared to be very lethargic and developed right developed right sided weakness. Per daughter, patient was unable to get up to use the restrooom so her sister picked patient up to use the rest room. Per daughter, patient slid forward  from the toilet to the floor. Per daughter, there was no head trauma or LOC. Per daughter, they were able to carry patient back to the bedroom. At approximately 5pm, they noticed that the  patient appeared more lethargic, confused and recently developed slurred speech. So they decide to call 911 and EMS brought patient to the hospital for further management. Per daughter, there have  been no fever, complaint of urinary symptoms, chest pain, SOB, cough, abdominal pain   OBJECTIVE:  Vital Signs Last 24 Hrs  T(C): 36.8 (04 May 2025 05:19), Max: 37 (04 May 2025 00:03)  T(F): 98.2 (04 May 2025 05:19), Max: 98.6 (04 May 2025 00:03)  HR: 101 (04 May 2025 05:19) (64 - 101)  BP: 114/97 (04 May 2025 05:19) (114/97 - 142/74)  BP(mean): --  RR: 18 (04 May 2025 05:19) (18 - 18)  SpO2: 96% (04 May 2025 05:19) (93% - 99%)    PLAN:   - Zyprexa 2.5 mg IM x 1 dose PRN for agitation,   - EKG in AM,  - Monitor QTc interval.     FOLLOW UP / RESULT:   - Maintain patient safety and comfort,   - Continue supportive care and treatment.
Audio Telestroke Consult Note    VIOLETTA WRIGHT ,MRN-[id number] , presented to *** , w/ last known normal time ***     HPI:  89F PMHx  Alport disease, HTN, HLD, compression fracture L1, L4-5 with foot drop, dementia, Parkinson's walks with a walker, BIBEMS for R sided weakness and AMS , LKW 11pm. AS per daughter noted patient with right-sided weakness and less responsive this morning.  Patient went to sleep at 11 PM, and got up at 9 AM.  Daughter reports pt was complaining of a lot of back pain, arm pain for past few days worse yesterday.  She was given [id number] mg during the day and 300 mg PM. NIHSS 14 (see below)    NEUROLOGIC EXAMINATION deferred – interprofessional audio discussion only   As per Dr. Greenwood exam note:  NIH Stroke Scale:   · Intubated	No  · Sedated/Unresponsive	No  · NIH Stroke Scale: LOC	(1) Not alert; but arousable by minor stimulation to obey, answer, or respond  · NIH Stroke Scale: LOC Question	(1) Answers one question correctly  · NIH Stroke Scale: LOC Command	(0) Performs both tasks correctly  · NIH Stroke Scale: Gaze	(0) Normal  · NIH Stroke Scale: Visual	(0) No visual loss  · NIH Stroke Scale: Facial	(1) Minor paralysis (flattened nasolabial fold, asymmetry on smiling)  · NIH Stroke Scale: Arm Left	(0) No drift; limb holds 90 (or 45) degrees for full 10 secs  · NIH Stroke Scale: Arm Right	(4) No movement  · NIH Stroke Scale: Leg Left	(2) Some effort against gravity; leg falls to bed by 5 secs, but has some effort against gravity  · NIH Stroke Scale: Leg Right	(4) No movement  · NIH Stroke Scale: Ataxia	(1) Present in one limb  · NIH Stroke Scale: Sensory	(0) Normal; no sensory loss  · NIH Stroke Scale: Language	(0) No aphasia; normal  · NIH Stroke Scale: Dysarthria	(0) Normal  · NIH Stroke Scale: Extinct Inattention	(0) No abnormality  · NIH Stroke Scale: Total	14    Pre-stroke MRS= 3    HOME MEDICATIONS:  Home Medications:         VITALS/DATA/ORDERS: [x] Reviewed     IMAGING:  < from: CT Brain Stroke Protocol (04.29.25 @ 19:03) >    No acute intracranial hemorrhage, mass effect, or midline shift.    < end of copied text >    < from: CT Angio Brain Stroke Protocol  w/ IV Cont (04.29.25 @ 19:21) >    CT PERFUSION:  Technical limitations: Motion artifact    Core infarction: 0 ml  Penumbra / tissue at risk for active ischemia: 93 mL    CTA NECK:  Severe bilateral calcified plaque.    CTA HEAD:  The proximal left M1 segment is not well visualized and there is a tangle   of small vessels in this region. Cannot exclude moyamoya syndrome. Distal   vasculature appears fairly symmetrical compared to the right. This is   likely chronic.    < end of copied text >      Labs:                                             11.9                  Neurophils% (auto):   x      (04-29 @ 19:47):    9.45 )-----------(157          Lymphocytes% (auto):  x                                             35.7                   Eosinphils% (auto):   x        Manual%: Neutrophils x    ; Lymphocytes x    ; Eosinophils x    ; Bands%: x    ; Blasts x                                    135    |  104    |  56                  Calcium: 9.1   / iCa: x      (04-29 @ 19:47)    ----------------------------<  131       Magnesium: 1.9                              5.1     |  25     |  1.46             Phosphorous: x        TPro  6.4    /  Alb  2.9    /  TBili  0.3    /  DBili  x      /  AST  22     /  ALT  26     /  AlkPhos  83     29 Apr 2025 19:47    ( 04-29 @ 19:47 )   PT: 11.8 sec;   INR: 1.02 ratio  aPTT: 28.1 sec    CAPILLARY BLOOD GLUCOSE      POCT Blood Glucose.: 192 mg/dL (29 Apr 2025 18:47)    Platelet Count - Automated: 157 K/uL (04-29-25 @ 19:47)        Inclusion Criteria:  Clearly defined time onset within 4.5 hours of treatment No [X]    Ischemic stroke with a measurable deficit on NIHSS or a non-measurable deficit that is deemed disabling? YES  [X]    Review thrombolytic CONTRAINDICATIONS    ABSOLUTE EXCLUSION CRITERIA:  [X] Patient outside of the appropriate time window for IV thrombolysis      RISKS/BENEFITS DISCUSSION done by ED provider.  RECOMMENDATIONS:    - NIHSS 14 w/ disabling symptoms  - Patient is out of window for TNK w/ LKW 11pm last night  - Discussed CTA imaging [facility] Dr. Nathan ; pt was deemed not a candidate for mechanical thrombectomy in setting of negative CTA w/ no clear acute LVO on imaging, age , poor baseline function and LKW >23hours ago.  - Admit patient for full stroke workup w/ MRI brain w..o contrast  - Maintain permissive HTN  - Further management and stroke workup by inhouse Neurology team  - Plan discussed with Dr. Greenwood and Dr. Nathan

## 2025-05-05 ENCOUNTER — INPATIENT (INPATIENT)
Facility: HOSPITAL | Age: 89
LOS: 2 days | Discharge: NURSING, REHAB OR LONG-TERM CARE | DRG: 66 | End: 2025-05-08
Attending: PSYCHIATRY & NEUROLOGY | Admitting: PSYCHIATRY & NEUROLOGY
Payer: MEDICARE

## 2025-05-05 ENCOUNTER — TRANSCRIPTION ENCOUNTER (OUTPATIENT)
Age: 89
End: 2025-05-05

## 2025-05-05 VITALS
OXYGEN SATURATION: 96 % | HEART RATE: 73 BPM | TEMPERATURE: 98 F | SYSTOLIC BLOOD PRESSURE: 155 MMHG | DIASTOLIC BLOOD PRESSURE: 70 MMHG | RESPIRATION RATE: 19 BRPM

## 2025-05-05 VITALS
TEMPERATURE: 98 F | OXYGEN SATURATION: 99 % | RESPIRATION RATE: 18 BRPM | DIASTOLIC BLOOD PRESSURE: 60 MMHG | SYSTOLIC BLOOD PRESSURE: 120 MMHG | HEART RATE: 65 BPM

## 2025-05-05 DIAGNOSIS — Z98.890 OTHER SPECIFIED POSTPROCEDURAL STATES: Chronic | ICD-10-CM

## 2025-05-05 PROBLEM — S32.000A WEDGE COMPRESSION FRACTURE OF UNSPECIFIED LUMBAR VERTEBRA, INITIAL ENCOUNTER FOR CLOSED FRACTURE: Chronic | Status: ACTIVE | Noted: 2025-04-30

## 2025-05-05 PROBLEM — Z86.69 PERSONAL HISTORY OF OTHER DISEASES OF THE NERVOUS SYSTEM AND SENSE ORGANS: Chronic | Status: ACTIVE | Noted: 2025-04-30

## 2025-05-05 PROBLEM — Z87.39 PERSONAL HISTORY OF OTHER DISEASES OF THE MUSCULOSKELETAL SYSTEM AND CONNECTIVE TISSUE: Chronic | Status: ACTIVE | Noted: 2025-04-30

## 2025-05-05 PROBLEM — I10 ESSENTIAL (PRIMARY) HYPERTENSION: Chronic | Status: ACTIVE | Noted: 2025-04-30

## 2025-05-05 PROBLEM — E78.5 HYPERLIPIDEMIA, UNSPECIFIED: Chronic | Status: ACTIVE | Noted: 2025-04-30

## 2025-05-05 PROBLEM — Q87.81 ALPORT SYNDROME: Chronic | Status: ACTIVE | Noted: 2025-04-30

## 2025-05-05 LAB
ALBUMIN SERPL ELPH-MCNC: 3.2 G/DL — LOW (ref 3.5–5)
ALP SERPL-CCNC: 94 U/L — SIGNIFICANT CHANGE UP (ref 40–120)
ALT FLD-CCNC: 35 U/L DA — SIGNIFICANT CHANGE UP (ref 10–60)
ANION GAP SERPL CALC-SCNC: 10 MMOL/L — SIGNIFICANT CHANGE UP (ref 5–17)
AST SERPL-CCNC: 21 U/L — SIGNIFICANT CHANGE UP (ref 10–40)
BILIRUB SERPL-MCNC: 0.5 MG/DL — SIGNIFICANT CHANGE UP (ref 0.2–1.2)
BUN SERPL-MCNC: 39 MG/DL — HIGH (ref 7–18)
CALCIUM SERPL-MCNC: 9.3 MG/DL — SIGNIFICANT CHANGE UP (ref 8.4–10.5)
CHLORIDE SERPL-SCNC: 106 MMOL/L — SIGNIFICANT CHANGE UP (ref 96–108)
CO2 SERPL-SCNC: 19 MMOL/L — LOW (ref 22–31)
CREAT SERPL-MCNC: 1.13 MG/DL — SIGNIFICANT CHANGE UP (ref 0.5–1.3)
EGFR: 47 ML/MIN/1.73M2 — LOW
EGFR: 47 ML/MIN/1.73M2 — LOW
GLUCOSE SERPL-MCNC: 96 MG/DL — SIGNIFICANT CHANGE UP (ref 70–99)
HCT VFR BLD CALC: 42 % — SIGNIFICANT CHANGE UP (ref 34.5–45)
HGB BLD-MCNC: 13.9 G/DL — SIGNIFICANT CHANGE UP (ref 11.5–15.5)
MCHC RBC-ENTMCNC: 30.3 PG — SIGNIFICANT CHANGE UP (ref 27–34)
MCHC RBC-ENTMCNC: 33.1 G/DL — SIGNIFICANT CHANGE UP (ref 32–36)
MCV RBC AUTO: 91.7 FL — SIGNIFICANT CHANGE UP (ref 80–100)
NRBC BLD AUTO-RTO: 0 /100 WBCS — SIGNIFICANT CHANGE UP (ref 0–0)
PLATELET # BLD AUTO: 184 K/UL — SIGNIFICANT CHANGE UP (ref 150–400)
POTASSIUM SERPL-MCNC: 4.2 MMOL/L — SIGNIFICANT CHANGE UP (ref 3.5–5.3)
POTASSIUM SERPL-SCNC: 4.2 MMOL/L — SIGNIFICANT CHANGE UP (ref 3.5–5.3)
PROT SERPL-MCNC: 6.7 G/DL — SIGNIFICANT CHANGE UP (ref 6–8.3)
RBC # BLD: 4.58 M/UL — SIGNIFICANT CHANGE UP (ref 3.8–5.2)
RBC # FLD: 14 % — SIGNIFICANT CHANGE UP (ref 10.3–14.5)
SODIUM SERPL-SCNC: 135 MMOL/L — SIGNIFICANT CHANGE UP (ref 135–145)
WBC # BLD: 9.88 K/UL — SIGNIFICANT CHANGE UP (ref 3.8–10.5)
WBC # FLD AUTO: 9.88 K/UL — SIGNIFICANT CHANGE UP (ref 3.8–10.5)

## 2025-05-05 PROCEDURE — 99239 HOSP IP/OBS DSCHRG MGMT >30: CPT

## 2025-05-05 RX ORDER — CLOPIDOGREL BISULFATE 75 MG/1
1 TABLET, FILM COATED ORAL
Qty: 0 | Refills: 0 | DISCHARGE
Start: 2025-05-05

## 2025-05-05 RX ORDER — MELATONIN 5 MG
1 TABLET ORAL
Qty: 0 | Refills: 0 | DISCHARGE
Start: 2025-05-05

## 2025-05-05 RX ORDER — GABAPENTIN 400 MG/1
1 CAPSULE ORAL
Refills: 0 | DISCHARGE

## 2025-05-05 RX ORDER — FUROSEMIDE 10 MG/ML
1 INJECTION INTRAMUSCULAR; INTRAVENOUS
Refills: 0 | DISCHARGE

## 2025-05-05 RX ORDER — LIDOCAINE HYDROCHLORIDE 20 MG/ML
1 JELLY TOPICAL DAILY
Refills: 0 | Status: DISCONTINUED | OUTPATIENT
Start: 2025-05-05 | End: 2025-05-08

## 2025-05-05 RX ORDER — ACETAMINOPHEN 500 MG/5ML
650 LIQUID (ML) ORAL EVERY 6 HOURS
Refills: 0 | Status: DISCONTINUED | OUTPATIENT
Start: 2025-05-05 | End: 2025-05-08

## 2025-05-05 RX ORDER — LORAZEPAM 4 MG/ML
2 VIAL (ML) INJECTION
Qty: 0 | Refills: 0 | DISCHARGE
Start: 2025-05-05

## 2025-05-05 RX ORDER — ATORVASTATIN CALCIUM 80 MG/1
1 TABLET, FILM COATED ORAL
Refills: 0 | DISCHARGE

## 2025-05-05 RX ORDER — QUETIAPINE FUMARATE 25 MG/1
1 TABLET ORAL
Qty: 0 | Refills: 0 | DISCHARGE
Start: 2025-05-05

## 2025-05-05 RX ORDER — LIDOCAINE HYDROCHLORIDE 20 MG/ML
1 JELLY TOPICAL
Qty: 0 | Refills: 0 | DISCHARGE
Start: 2025-05-05

## 2025-05-05 RX ORDER — ACETAMINOPHEN 500 MG/5ML
2 LIQUID (ML) ORAL
Qty: 0 | Refills: 0 | DISCHARGE
Start: 2025-05-05

## 2025-05-05 RX ORDER — ATORVASTATIN CALCIUM 80 MG/1
1 TABLET, FILM COATED ORAL
Qty: 0 | Refills: 0 | DISCHARGE
Start: 2025-05-05

## 2025-05-05 RX ORDER — TRAZODONE HCL 100 MG
1 TABLET ORAL
Refills: 0 | DISCHARGE

## 2025-05-05 RX ORDER — ACETAMINOPHEN 500 MG/5ML
1 LIQUID (ML) ORAL
Refills: 0 | DISCHARGE

## 2025-05-05 RX ORDER — CLOPIDOGREL BISULFATE 75 MG/1
75 TABLET, FILM COATED ORAL DAILY
Refills: 0 | Status: DISCONTINUED | OUTPATIENT
Start: 2025-05-05 | End: 2025-05-08

## 2025-05-05 RX ORDER — ATORVASTATIN CALCIUM 80 MG/1
40 TABLET, FILM COATED ORAL AT BEDTIME
Refills: 0 | Status: DISCONTINUED | OUTPATIENT
Start: 2025-05-05 | End: 2025-05-08

## 2025-05-05 RX ORDER — ASPIRIN 325 MG
1 TABLET ORAL
Qty: 0 | Refills: 0 | DISCHARGE
Start: 2025-05-05

## 2025-05-05 RX ORDER — HEPARIN SODIUM 1000 [USP'U]/ML
5000 INJECTION INTRAVENOUS; SUBCUTANEOUS EVERY 12 HOURS
Refills: 0 | Status: DISCONTINUED | OUTPATIENT
Start: 2025-05-05 | End: 2025-05-08

## 2025-05-05 RX ORDER — GABAPENTIN 400 MG/1
100 CAPSULE ORAL DAILY
Refills: 0 | Status: DISCONTINUED | OUTPATIENT
Start: 2025-05-05 | End: 2025-05-08

## 2025-05-05 RX ORDER — SPIRONOLACTONE 25 MG
1 TABLET ORAL
Refills: 0 | DISCHARGE

## 2025-05-05 RX ORDER — GABAPENTIN 400 MG/1
300 CAPSULE ORAL AT BEDTIME
Refills: 0 | Status: DISCONTINUED | OUTPATIENT
Start: 2025-05-05 | End: 2025-05-08

## 2025-05-05 RX ORDER — QUETIAPINE FUMARATE 25 MG/1
25 TABLET ORAL AT BEDTIME
Refills: 0 | Status: DISCONTINUED | OUTPATIENT
Start: 2025-05-05 | End: 2025-05-08

## 2025-05-05 RX ORDER — LIDOCAINE HYDROCHLORIDE 20 MG/ML
1 JELLY TOPICAL
Refills: 0 | DISCHARGE

## 2025-05-05 RX ORDER — ASPIRIN 325 MG
81 TABLET ORAL DAILY
Refills: 0 | Status: DISCONTINUED | OUTPATIENT
Start: 2025-05-05 | End: 2025-05-08

## 2025-05-05 RX ORDER — RISPERIDONE 4 MG
0.5 TABLET ORAL
Qty: 0 | Refills: 0 | DISCHARGE
Start: 2025-05-05

## 2025-05-05 RX ORDER — MELATONIN 5 MG
3 TABLET ORAL AT BEDTIME
Refills: 0 | Status: DISCONTINUED | OUTPATIENT
Start: 2025-05-05 | End: 2025-05-08

## 2025-05-05 RX ADMIN — HEPARIN SODIUM 5000 UNIT(S): 1000 INJECTION INTRAVENOUS; SUBCUTANEOUS at 16:54

## 2025-05-05 RX ADMIN — HEPARIN SODIUM 5000 UNIT(S): 1000 INJECTION INTRAVENOUS; SUBCUTANEOUS at 05:11

## 2025-05-05 RX ADMIN — LIDOCAINE HYDROCHLORIDE 1 PATCH: 20 JELLY TOPICAL at 12:04

## 2025-05-05 RX ADMIN — Medication 650 MILLIGRAM(S): at 20:03

## 2025-05-05 RX ADMIN — ATORVASTATIN CALCIUM 40 MILLIGRAM(S): 80 TABLET, FILM COATED ORAL at 21:21

## 2025-05-05 RX ADMIN — Medication 81 MILLIGRAM(S): at 12:04

## 2025-05-05 RX ADMIN — Medication 3 MILLIGRAM(S): at 21:21

## 2025-05-05 RX ADMIN — Medication 650 MILLIGRAM(S): at 21:03

## 2025-05-05 RX ADMIN — GABAPENTIN 300 MILLIGRAM(S): 400 CAPSULE ORAL at 21:22

## 2025-05-05 RX ADMIN — Medication 650 MILLIGRAM(S): at 12:04

## 2025-05-05 RX ADMIN — QUETIAPINE FUMARATE 25 MILLIGRAM(S): 25 TABLET ORAL at 21:21

## 2025-05-05 RX ADMIN — Medication 20 MILLIGRAM(S): at 12:16

## 2025-05-05 RX ADMIN — CLOPIDOGREL BISULFATE 75 MILLIGRAM(S): 75 TABLET, FILM COATED ORAL at 12:04

## 2025-05-05 RX ADMIN — LIDOCAINE HYDROCHLORIDE 1 PATCH: 20 JELLY TOPICAL at 00:54

## 2025-05-05 RX ADMIN — LIDOCAINE HYDROCHLORIDE 1 PATCH: 20 JELLY TOPICAL at 20:02

## 2025-05-05 NOTE — H&P ADULT - TIME BILLING
I spent 80 minutes in preparation to see the patient, including reviewing the brain imaging in past one year, obtaining and/or reviewing the resident/ or PA note, separately obtained history, performing a medically appropriate examination and/or evaluation as documented in this encounter note, counseling and educating of the patient, ordering tests, documenting clinical information in patients electronic record , interpreting results (not separately reported) and communicating results to the patient.   Evaluation was performed on 5/6/25      Clinically: much improved   CTA: severe B/L ICA stenosis/ l-MCA stenosis  MRI: B/L small scattered embolic stroke, suspicious for cardioembolic vs hypoperfusion   PLAN:    .Continue Aspirin / Plavix   . Continue Statin with LDL goal< 70  . Strict BP control with Goal < 140/80   . PT/OT/Speech

## 2025-05-05 NOTE — CONSULT NOTE ADULT - SUBJECTIVE AND OBJECTIVE BOX
Aniya, [place]ula  89F Demented, AOx1-2 @ baseline, premorbid mRS 3,  lumbar spondylolisthesis, p/t FH w/ Rt sided weakness. LKW 4/28 11pm. CTH R parietal infarct, CTA w/ chrnoic M1 occlusion w/ Alamo-Alamo like vessels, CTP limited. MR w/ DWI hits in Lt somatosensory > motor cortex. Started on DAPT, tx to [facility] for further care. Exam:  AOx2, EOMI, no facial, Lt side 5/5, Rt side 4/5, detailed motor and sensory exam limited by mentation

## 2025-05-05 NOTE — H&P ADULT - ASSESSMENT
89y Ambidextrous/ L hand dominant  Female with PMHx of  Dementia, Alport syndrome, HTN, HLD, Lumbar compression Fx, PD presented to [facility] ED on 4/29 for eval of Right sided weakness. LKWT 4/28 around 2300 and woke up on Tuesday (4/29) AM was unable to move her R side, appeared lethargic to the family and pt slept most of the morning until noon time. Pt noted to be more lethargic and generally weak post lunch yesterday, unable to move, slumped to her Right side, still responsive, talking to the family, no LOC/Fall but very weak and hence was brought to ED. Initial Non con CTH w/ no acute intracranial pathology, encephalomalacia and gliosis in R parietal lobe, CTA H/N w/some severe extracranial carotid stenosis 2/2 calcified plaque and C/F moyamoya syndrome given tangle of vessel appearance in LM1 region with symmetrical distal vasculature. Pt deemed not a candidate for any stroke intervention, no TNK 2/2 out of the time window, No EVT 2/2 no LVO in CTA.     Impression: Acute right sided weakness with correlated to acute cortical infracts in L precentral gyrus/frontoparietal region, possible due to EFRAIN in setting of LM1 stenosis  Also noted to have incident right precentral gyrus, acute/subacute infarct, Mechanism possible cardioembolic in setting of multi-territory infarcts    Recommendations  #Right sided hemiparesis 2/2 left precentral gyrus  - Continue ASA 81mg and Plavix for 21 days and then ASA 81mg indefinitely  - NSGY consults, recs appreciated  - Gradual normotension <140/90  - Stroke neurochecks and Vital signs q4   - PT/OT  - telemonitoring, will likely need ILR inpatient or outpatient  - Hbg 6.4    #HTN - chronic  - Patient can maintain gradual normotension (<140/90)  - Home Spironolactone 25mg, Lasix 40mg daily, Valsartan 80mg daily was held at [facility] -> can restart gradually    #HLD - chronic  - Can continue atorvastatin 40mg at home, LDL 61    #Lumbar pain - chronic  - Lidocaine patch daily  - Continue gabapentin     #Dementia/agitation - chronic  - COntinue Seroquel 25mg at bedtime    DVT prop: Heparin subq  PT/OT: y -> likely [facility]  Diet: Renal/DASH  Dispo: Likely pending [facility]    Case/imaging discussed with neurovascular fellow Kranthi Carroll under supervision of attending, Ulises Sparks          89y Ambidextrous/ L hand dominant  Female with PMHx of  Dementia, Alport syndrome, HTN, HLD, Lumbar compression Fx, PD presented to [facility] ED on 4/29 for eval of Right sided weakness. LKWT 4/28 around 2300 and woke up on Tuesday (4/29) AM was unable to move her R side, appeared lethargic to the family and pt slept most of the morning until noon time. Pt noted to be more lethargic and generally weak post lunch yesterday, unable to move, slumped to her Right side, still responsive, talking to the family, no LOC/Fall but very weak and hence was brought to ED. Initial Non con CTH w/ no acute intracranial pathology, encephalomalacia and gliosis in R parietal lobe, CTA H/N w/some severe extracranial carotid stenosis 2/2 calcified plaque and C/F moyamoya syndrome given tangle of vessel appearance in LM1 region with symmetrical distal vasculature. Pt deemed not a candidate for any stroke intervention, no TNK 2/2 out of the time window, No EVT 2/2 no LVO in CTA.     Impression: Acute right sided weakness with correlated to acute cortical infracts in L precentral gyrus/frontoparietal region, possible due to EFRAIN in setting of LM1 stenosis  Also noted to have incident right precentral gyrus, acute/subacute infarct, Mechanism possible cardioembolic in setting of multi-territory infarcts    Recommendations  #Right sided hemiparesis 2/2 left precentral gyrus  - Continue ASA 81mg and Plavix for 21 days and then ASA 81mg indefinitely  - NSGY consults, recs appreciated  - Gradual normotension <140/90  - Stroke neurochecks and Vital signs q4   - PT/OT  - telemonitoring, will likely need ILR inpatient or outpatient  - HbA1c 6.4    #HTN - chronic  - Patient can maintain gradual normotension (<140/90)  - Home Spironolactone 25mg, Lasix 40mg daily, Valsartan 80mg daily was held at [facility] -> can restart gradually    #HLD - chronic  - Can continue atorvastatin 40mg at home, LDL 61    #Lumbar pain - chronic  - Lidocaine patch daily  - Continue gabapentin 100 daily and 300mg at nightime    #Dementia/agitation - chronic  - COntinue Seroquel 25mg at bedtime    DVT prop: Heparin subq  PT/OT: y -> likely [facility]  Diet: Renal/DASH  Dispo: Likely pending [facility]    Case/imaging discussed with neurovascular fellow Kranthi Carroll under supervision of attending, Ulises Sparks          89y Ambidextrous/ L hand dominant  Female with PMHx of  Dementia, Alport syndrome, HTN, HLD, Lumbar compression Fx, PD presented to [facility] ED on 4/29 for eval of Right sided weakness. LKWT 4/28 around 2300 and woke up on Tuesday (4/29) AM was unable to move her R side, appeared lethargic to the family and pt slept most of the morning until noon time. Pt noted to be more lethargic and generally weak post lunch yesterday, unable to move, slumped to her Right side, still responsive, talking to the family, no LOC/Fall but very weak and hence was brought to ED. Initial Non con CTH w/ no acute intracranial pathology, encephalomalacia and gliosis in R parietal lobe, CTA H/N w/some severe extracranial carotid stenosis 2/2 calcified plaque and C/F moyamoya syndrome given tangle of vessel appearance in LM1 region with symmetrical distal vasculature. Pt deemed not a candidate for any stroke intervention, no TNK 2/2 out of the time window, No EVT 2/2 no LVO in CTA.     NIHSS: 6 (2 - RUE drift, 2 - RLE drift, 2 - LOC questions)  mRS: 4    Impression: Acute right sided weakness with correlated to acute cortical infracts in L precentral gyrus/frontoparietal region, possible due to EFRAIN in setting of LM1 stenosis  Also noted to have incident right precentral gyrus, acute/subacute infarct, Mechanism possible cardioembolic in setting of multi-territory infarcts    Recommendations  #Right sided hemiparesis 2/2 left precentral gyrus  - Continue ASA 81mg and Plavix for 21 days and then ASA 81mg indefinitely  - NSGY consults, recs appreciated  - Gradual normotension <140/90  - Stroke neurochecks and Vital signs q4   - PT/OT  - telemonitoring, will likely need ILR inpatient or outpatient  - HbA1c 6.4    #HTN - chronic  - Patient can maintain gradual normotension (<140/90)  - Home Spironolactone 25mg, Lasix 40mg daily, Valsartan 80mg daily was held at [facility] -> can restart gradually    #HLD - chronic  - Can continue atorvastatin 40mg at home, LDL 61    #Lumbar pain - chronic  - Lidocaine patch daily  - Continue gabapentin 100 daily and 300mg at nightime    #Dementia/agitation - chronic  - COntinue Seroquel 25mg at bedtime    DVT prop: Heparin subq  PT/OT: y -> likely [facility]  Diet: Renal/DASH  Dispo: Likely pending [facility]    Case/imaging discussed with neurovascular fellow Kranthi Carroll under supervision of attending, Ulises Sparks

## 2025-05-05 NOTE — DISCHARGE NOTE NURSING/CASE MANAGEMENT/SOCIAL WORK - PATIENT PORTAL LINK FT
You can access the FollowMyHealth Patient Portal offered by [facility] by registering at the following website: [email or web address]. By joining [facility]’s FollowMyHealth portal, you will also be able to view your health information using other applications (apps) compatible with our system.

## 2025-05-05 NOTE — H&P ADULT - NSHPLABSRESULTS_GEN_ALL_CORE
MR Brain 4/30/25  IMPRESSION:  Limited by motion.  1. Acute-subacute cortical infarcts LEFT precentral gyrus and LEFT frontoparietal parasagittal region.  2. Additional findings described in detail above.  Addendum: Question of additional subcentimeter focus of recent ischemia right precentral gyrus, however without definitive corresponding ADC or FLAIR abnormality, possibly artifactual. Corroborate with clinical findings.     TTE 4/30/25  CONCLUSIONS:   1. Left ventricular systolic function is normal with an ejection fraction of 66 % by Diehl's method of disks.   2. There is normal LV mass and concentric remodeling.   3. There is mild (grade 1) left ventricular diastolic dysfunction.   4. The right ventricle is not well visualized. probably normal right ventricular cavity size and probably normal right ventricular systolic function.   5. Estimated pulmonary artery systolic pressure is 43 mmHg, consistent with mild pulmonary hypertension.   6. Agitated saline injection was negative for intracardiac shunt.    CTH 4/29/25  INTRA-AXIAL: No mass effect, acute hemorrhage, or midline shift. There are periventricular and subcortical white matter hypodensities, consistent with microvascular type changes. Encephalomalacia and gliosis right parietal lobe.    CTP/CTA 4/29/25  IMPRESSION:  CT PERFUSION:  Technical limitations: Motion artifact  Core infarction: 0 ml  Penumbra / tissue at risk for active ischemia: 93 mL    CTA NECK:  Severe bilateral calcified plaque.    CTA HEAD:  The proximal left M1 segment is not well visualized and there is a tangle of small vessels in this region. Cannot exclude moyamoya syndrome. Distal vasculature appears fairly symmetrical compared to the right. This is likely chronic.    US BLE 5/1  IMPRESSION:  No acute DVT of the right or left lower extremity. Limited study due to difficulty with patient positioning.

## 2025-05-05 NOTE — H&P ADULT - HISTORY OF PRESENT ILLNESS
89y Ambidextrous/ L hand dominant  Female with PMHx of  Dementia, Alport syndrome, HTN, HLD, Lumbar compression Fx, PD presented to [facility] ED on 4/29 for eval of Right sided weakness. LKWT 4/28 around 2300 and woke up on Tuesday (4/29) AM was unable to move her R side, appeared lethargic to the family and pt slept most of the morning until noon time. Pt noted to be more lethargic and generally weak post lunch yesterday, unable to move, slumped to her Right side, still responsive, talking to the family, no LOC/Fall but very weak and hence was brought to ED. Initial Non con CTH w/ no acute intracranial pathology, encephalomalacia and gliosis in R parietal lobe, CTA H/N w/some severe extracranial carotid stenosis 2/2 calcified plaque and C/F moyamoya syndrome given tangle of vessel appearance in LM1 region with symmetrical distal vasculature. Pt deemed not a candidate for any stroke intervention, no TNK 2/2 out of the time window, No EVT 2/2 no LVO in CTA.   Of note, pt had a fall last year, has R foot drop, and had a DVT last year per family. Pt normally ambulates slow but able to ambulate w/walker @ baseline, needs help with all ADLs.   Patient was then transferred to [facility] for NSGY evaluation for concern for moyamoya.

## 2025-05-05 NOTE — DISCHARGE NOTE NURSING/CASE MANAGEMENT/SOCIAL WORK - FINANCIAL ASSISTANCE
[facility] provides services at a reduced cost to those who are determined to be eligible through [facility]’s financial assistance program. Information regarding [facility]’s financial assistance program can be found by going to [email or web address] or by calling [phone].

## 2025-05-05 NOTE — H&P ADULT - NSHPPHYSICALEXAM_GEN_ALL_CORE
Physical Examination:  General - NAD, pleasant, cooperative   Neurologic Exam:  Mental status - Awake, Alert, Oriented to person, hospital but not month, age. Speech fluent, naming and repetition intact. Follows some simple commands.     Cranial nerves: Right 2mm, Left 1mm reactive, surgical, no gaze pre fence, intact sensation, no apparent facial asymmetry     Motor - Slightly increased tone throughout, mild cogwheeling b/l, 4-/5 in RUE push/pull/, 4+/5 in LUE, 3/5 in RLE, 4+/5 in LLE    Sensation - Light touch intact b/l    DTR's - Deferred 2/2 focused exam    Coordination - Deferred 2/2 mental status    Gait and station - Untested 2/2 patient safety

## 2025-05-05 NOTE — PATIENT PROFILE ADULT - IS THERE A SUSPICION OF ABUSE/NEGLIGENCE?
Duration Of Freeze Thaw-Cycle (Seconds): 10 Render Post-Care Instructions In Note?: no Number Of Freeze-Thaw Cycles: 2 freeze-thaw cycles Detail Level: Zone Consent: The patient's consent was obtained including but not limited to risks of crusting, scabbing, blistering, scarring, darker or lighter pigmentary change, recurrence, incomplete removal and infection. Show Aperture Variable?: Yes Post-Care Instructions: I reviewed with the patient in detail post-care instructions. Patient is to wear sunprotection, and avoid picking at any of the treated lesions. Pt may apply Vaseline to crusted or scabbing areas. no

## 2025-05-06 DIAGNOSIS — E78.5 HYPERLIPIDEMIA, UNSPECIFIED: ICD-10-CM

## 2025-05-06 DIAGNOSIS — I10 ESSENTIAL (PRIMARY) HYPERTENSION: ICD-10-CM

## 2025-05-06 LAB
ANION GAP SERPL CALC-SCNC: 15 MMOL/L — SIGNIFICANT CHANGE UP (ref 5–17)
BUN SERPL-MCNC: 48 MG/DL — HIGH (ref 7–23)
CALCIUM SERPL-MCNC: 9.3 MG/DL — SIGNIFICANT CHANGE UP (ref 8.4–10.5)
CHLORIDE SERPL-SCNC: 105 MMOL/L — SIGNIFICANT CHANGE UP (ref 96–108)
CO2 SERPL-SCNC: 17 MMOL/L — LOW (ref 22–31)
CREAT SERPL-MCNC: 1.25 MG/DL — SIGNIFICANT CHANGE UP (ref 0.5–1.3)
EGFR: 42 ML/MIN/1.73M2 — LOW
EGFR: 42 ML/MIN/1.73M2 — LOW
GLUCOSE SERPL-MCNC: 109 MG/DL — HIGH (ref 70–99)
HCT VFR BLD CALC: 40.3 % — SIGNIFICANT CHANGE UP (ref 34.5–45)
HGB BLD-MCNC: 13 G/DL — SIGNIFICANT CHANGE UP (ref 11.5–15.5)
MAGNESIUM SERPL-MCNC: 2.2 MG/DL — SIGNIFICANT CHANGE UP (ref 1.6–2.6)
MCHC RBC-ENTMCNC: 29.9 PG — SIGNIFICANT CHANGE UP (ref 27–34)
MCHC RBC-ENTMCNC: 32.3 G/DL — SIGNIFICANT CHANGE UP (ref 32–36)
MCV RBC AUTO: 92.6 FL — SIGNIFICANT CHANGE UP (ref 80–100)
NRBC BLD AUTO-RTO: 0 /100 WBCS — SIGNIFICANT CHANGE UP (ref 0–0)
PHOSPHATE SERPL-MCNC: 5.2 MG/DL — HIGH (ref 2.5–4.5)
PLATELET # BLD AUTO: 181 K/UL — SIGNIFICANT CHANGE UP (ref 150–400)
POTASSIUM SERPL-MCNC: 4.9 MMOL/L — SIGNIFICANT CHANGE UP (ref 3.5–5.3)
POTASSIUM SERPL-SCNC: 4.9 MMOL/L — SIGNIFICANT CHANGE UP (ref 3.5–5.3)
RBC # BLD: 4.35 M/UL — SIGNIFICANT CHANGE UP (ref 3.8–5.2)
RBC # FLD: 14 % — SIGNIFICANT CHANGE UP (ref 10.3–14.5)
SODIUM SERPL-SCNC: 137 MMOL/L — SIGNIFICANT CHANGE UP (ref 135–145)
WBC # BLD: 8.66 K/UL — SIGNIFICANT CHANGE UP (ref 3.8–10.5)
WBC # FLD AUTO: 8.66 K/UL — SIGNIFICANT CHANGE UP (ref 3.8–10.5)

## 2025-05-06 PROCEDURE — 70544 MR ANGIOGRAPHY HEAD W/O DYE: CPT | Mod: 26

## 2025-05-06 PROCEDURE — 93880 EXTRACRANIAL BILAT STUDY: CPT | Mod: 26

## 2025-05-06 RX ORDER — ALPRAZOLAM 0.5 MG
0.25 TABLET, EXTENDED RELEASE 24 HR ORAL ONCE
Refills: 0 | Status: DISCONTINUED | OUTPATIENT
Start: 2025-05-06 | End: 2025-05-06

## 2025-05-06 RX ORDER — QUETIAPINE FUMARATE 25 MG/1
25 TABLET ORAL ONCE
Refills: 0 | Status: COMPLETED | OUTPATIENT
Start: 2025-05-06 | End: 2025-05-06

## 2025-05-06 RX ADMIN — LIDOCAINE HYDROCHLORIDE 1 PATCH: 20 JELLY TOPICAL at 07:45

## 2025-05-06 RX ADMIN — Medication 500 MILLILITER(S): at 11:36

## 2025-05-06 RX ADMIN — QUETIAPINE FUMARATE 25 MILLIGRAM(S): 25 TABLET ORAL at 23:34

## 2025-05-06 RX ADMIN — Medication 81 MILLIGRAM(S): at 11:36

## 2025-05-06 RX ADMIN — LIDOCAINE HYDROCHLORIDE 1 PATCH: 20 JELLY TOPICAL at 09:41

## 2025-05-06 RX ADMIN — Medication 20 MILLIGRAM(S): at 11:36

## 2025-05-06 RX ADMIN — CLOPIDOGREL BISULFATE 75 MILLIGRAM(S): 75 TABLET, FILM COATED ORAL at 11:36

## 2025-05-06 RX ADMIN — GABAPENTIN 300 MILLIGRAM(S): 400 CAPSULE ORAL at 21:15

## 2025-05-06 RX ADMIN — HEPARIN SODIUM 5000 UNIT(S): 1000 INJECTION INTRAVENOUS; SUBCUTANEOUS at 17:43

## 2025-05-06 RX ADMIN — Medication 3 MILLIGRAM(S): at 21:15

## 2025-05-06 RX ADMIN — GABAPENTIN 100 MILLIGRAM(S): 400 CAPSULE ORAL at 11:36

## 2025-05-06 RX ADMIN — HEPARIN SODIUM 5000 UNIT(S): 1000 INJECTION INTRAVENOUS; SUBCUTANEOUS at 05:22

## 2025-05-06 RX ADMIN — Medication 0.25 MILLIGRAM(S): at 11:36

## 2025-05-06 RX ADMIN — Medication 500 MILLILITER(S): at 17:43

## 2025-05-06 RX ADMIN — QUETIAPINE FUMARATE 25 MILLIGRAM(S): 25 TABLET ORAL at 21:15

## 2025-05-06 RX ADMIN — ATORVASTATIN CALCIUM 40 MILLIGRAM(S): 80 TABLET, FILM COATED ORAL at 21:15

## 2025-05-06 NOTE — CONSULT NOTE ADULT - PROBLEM SELECTOR RECOMMENDATION 9
Acute-subacute cortical infarcts LEFT precentral gyrus and LEFT frontoparietal parasagittal region.  DAPT  Neurosx consulted   TTE wnl  Monitor on tele  Possible ILR placement prior to dc

## 2025-05-06 NOTE — OCCUPATIONAL THERAPY INITIAL EVALUATION ADULT - LIVES WITH, PROFILE
Lives with daughter in a pvt home, chair lift from basement to living level. 0 steps inside. (A) with ADLs and functional transfers, uses rolling walker. Owns standard wheelchair, commode, and shower chair. Stall shower with grab bars. (-) HHA, family provides all assist./children

## 2025-05-06 NOTE — SPEECH LANGUAGE PATHOLOGY EVALUATION - COMMENTS
continued: Patient was then transferred to [facility] for NSGY evaluation for concern for moyamoya. 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 Also noted to have incident right precentral gyrus, acute/subacute infarct, Mechanism possible cardioembolic in setting of multi-territory infarcts. On tele, likely will need ILR inpatient or outpatient. NSGY -> MRA w/ [facility]; Further plan pending MRI but likely trial of DAPT. Pending d/c to [facility].     IMAGING: CT PERFUSION: Technical limitations: Motion artifact. Core infarction: 0 ml  Penumbra / tissue at risk for active ischemia: 93 mL. CTA NECK: Severe bilateral calcified plaque. CTA HEAD: The proximal left M1 segment is not well visualized and there is a tangle of small vessels in this region. Cannot exclude moyamoya syndrome. Distal vasculature appears fairly symmetrical compared to the right. This is likely chronic. MR Head 4/30: Limited by motion. 1. Acute-subacute cortical infarcts LEFT precentral gyrus and LEFT frontoparietal parasagittal region. CXR: Left lower lobe infiltrate/atelectasis.    *New to this service.*

## 2025-05-06 NOTE — OCCUPATIONAL THERAPY INITIAL EVALUATION ADULT - NAME OF CLINICIAN
DAVID Sullivan Consent 3/Introductory Paragraph: I gave the patient a chance to ask questions they had about the procedure.  Following this I explained the Mohs procedure and consent was obtained. The risks, benefits and alternatives to therapy were discussed in detail. Specifically, the risks of infection, scarring, bleeding, prolonged wound healing, incomplete removal, allergy to anesthesia, nerve injury and recurrence were addressed. Prior to the procedure, the treatment site was clearly identified and confirmed by the patient. All components of Universal Protocol/PAUSE Rule completed.

## 2025-05-06 NOTE — PHYSICAL THERAPY INITIAL EVALUATION ADULT - PERTINENT HX OF CURRENT PROBLEM, REHAB EVAL
PMHx: Dementia, Alport syndrome, HTN, HLD, Lumbar compression Fx, PD, DVT, fall last year. presented to [facility] ED on 4/29 with R sided weakness. LKW 4/28 ~2300, pt woke up on 4/29 AM was unable to move  R side, appeared lethargic to the family & pt slept most of the morning until noon time. Pt noted to be more lethargic, generally weak post lunch yesterday, unable to move, slumped to her R side, still responsive, talking to the family, no LOC/Fall but very weak & was brought to ED. Initial CTH: no acute intracranial pathology, encephalomalacia & gliosis in R parietal lobe, CTA H/N: some severe extracranial carotid stenosis due to calcified plaque and concern for moyamoya syndrome given tangle of vessel appearance in L M1 region with symmetrical distal vasculature. Pt deemed not a candidate for any stroke intervention due to out of the time window and no LVO on CTA. Pt was then transferred to [facility] for NSGY evaluation for concern for moyamoya.     MR Brain 4/30/25: Acute-subacute cortical infarcts LEFT precentral gyrus and LEFT frontoparietal parasagittal region. Question of additional subcentimeter focus of recent ischemia right precentral gyrus, however without definitive corresponding ADC or FLAIR abnormality, possibly artifactual.  CTH 4/29: No mass effect, acute hemorrhage, or midline shift. There are periventricular and subcortical white matter hypodensities, consistent with microvascular type changes. Encephalomalacia and gliosis right parietal lobe.  CTP 4/29: Technical limitations: Motion artifact, Core infarction: 0ml, Penumbra/tissue at risk for active ischemia: 93mL  CTA NECK 4/29: Severe bilateral calcified plaque.  CTA HEAD 4/29: The proximal left M1 segment is not well visualized and there is a tangle of small vessels in this region. Cannot exclude moyamoya syndrome. Distal vasculature appears fairly symmetrical compared to the right. This is likely chronic.  US BLE 5/1: No acute DVT of the right or left lower extremity. Limited study due to difficulty with patient positioning

## 2025-05-06 NOTE — OCCUPATIONAL THERAPY INITIAL EVALUATION ADULT - NS ASR FOLLOW COMMAND OT EVAL
decreased initiation noted t/o session; delayed processing/50% of the time/able to follow single-step instructions

## 2025-05-06 NOTE — OCCUPATIONAL THERAPY INITIAL EVALUATION ADULT - COGNITIVE, VISUAL PERCEPTUAL, OT EVAL
Pt will follow 75% simple directions with cues as needed within 2 weeks. Pt will initiate transfers with 1 cue within 2 weeks.

## 2025-05-06 NOTE — CONSULT NOTE ADULT - ASSESSMENT
-MRA w/ [facility]  -Further plan pending MRI but likely trial of DAPT
89y Ambidextrous/ L hand dominant  Female with PMHx of  Dementia, Alport syndrome, HTN, HLD, Lumbar compression Fx, PD presented to [facility] ED on 4/29 for eval of Right sided weakness. LKWT 4/28 around 2300 and woke up on Tuesday (4/29) AM was unable to move her R side, appeared lethargic to the family and pt slept most of the morning until noon time. Pt noted to be more lethargic and generally weak post lunch yesterday, unable to move, slumped to her Right side, still responsive, talking to the family, no LOC/Fall but very weak and hence was brought to ED. Initial Non con CTH w/ no acute intracranial pathology, encephalomalacia and gliosis in R parietal lobe, CTA H/N w/some severe extracranial carotid stenosis 2/2 calcified plaque and C/F moyamoya syndrome given tangle of vessel appearance in LM1 region with symmetrical distal vasculature. Pt deemed not a candidate for any stroke intervention, no TNK 2/2 out of the time window, No EVT 2/2 no LVO in CTA.   Of note, pt had a fall last year, has R foot drop, and had a DVT last year per family. Pt normally ambulates slow but able to ambulate w/walker @ baseline, needs help with all ADLs.   Patient was then transferred to [facility] for NSGY evaluation for concern for moyamoya.

## 2025-05-06 NOTE — CONSULT NOTE ADULT - SUBJECTIVE AND OBJECTIVE BOX
CHIEF COMPLAINT:    HISTORY OF PRESENT ILLNESS:  89y Ambidextrous/ L hand dominant  Female with PMHx of  Dementia, Alport syndrome, HTN, HLD, Lumbar compression Fx, PD presented to [facility] ED on 4/29 for eval of Right sided weakness. LKWT 4/28 around 2300 and woke up on Tuesday (4/29) AM was unable to move her R side, appeared lethargic to the family and pt slept most of the morning until noon time. Pt noted to be more lethargic and generally weak post lunch yesterday, unable to move, slumped to her Right side, still responsive, talking to the family, no LOC/Fall but very weak and hence was brought to ED. Initial Non con CTH w/ no acute intracranial pathology, encephalomalacia and gliosis in R parietal lobe, CTA H/N w/some severe extracranial carotid stenosis 2/2 calcified plaque and C/F moyamoya syndrome given tangle of vessel appearance in LM1 region with symmetrical distal vasculature. Pt deemed not a candidate for any stroke intervention, no TNK 2/2 out of the time window, No EVT 2/2 no LVO in CTA.   Of note, pt had a fall last year, has R foot drop, and had a DVT last year per family. Pt normally ambulates slow but able to ambulate w/walker @ baseline, needs help with all ADLs.   Patient was then transferred to [facility] for NSGY evaluation for concern for moyamoya.     PAST MEDICAL & SURGICAL HISTORY:  Dementia      Alport syndrome      HTN (hypertension)      HLD (hyperlipidemia)      History of scoliosis      Lumbar compression fracture      H/O Parkinson's disease      S/P lumbar spine operation              MEDICATIONS:  aspirin enteric coated 81 milliGRAM(s) Oral daily  clopidogrel Tablet 75 milliGRAM(s) Oral daily  heparin   Injectable 5000 Unit(s) SubCutaneous every 12 hours        acetaminophen     Tablet .. 650 milliGRAM(s) Oral every 6 hours PRN  gabapentin 300 milliGRAM(s) Oral at bedtime  gabapentin 100 milliGRAM(s) Oral daily  melatonin 3 milliGRAM(s) Oral at bedtime  QUEtiapine 25 milliGRAM(s) Oral at bedtime    famotidine    Tablet 20 milliGRAM(s) Oral daily    atorvastatin 40 milliGRAM(s) Oral at bedtime    lidocaine   4% Patch 1 Patch Transdermal daily PRN      FAMILY HISTORY:  Family history of deafness (Mother, Child, Grandparent, Aunt)        SOCIAL HISTORY:    [ ] Non-smoker  [ ] Smoker  [ ] Alcohol    Allergies    losartan (Angioedema)  penicillin (Rash)    Intolerances    	    REVIEW OF SYSTEMS:	  [ XX] Unable to obtain d/t dementia     PHYSICAL EXAM:  T(C): 37 (05-06-25 @ 08:00), Max: 37 (05-06-25 @ 08:00)  HR: 59 (05-06-25 @ 08:00) (59 - 92)  BP: 106/56 (05-06-25 @ 08:00) (106/56 - 155/70)  RR: 12 (05-06-25 @ 08:00) (12 - 21)  SpO2: 98% (05-06-25 @ 08:00) (95% - 99%)  Wt(kg): --  I&O's Summary    05 May 2025 07:01  -  06 May 2025 07:00  --------------------------------------------------------  IN: 120 mL / OUT: 850 mL / NET: -730 mL        Appearance: Normal	  HEENT:   Normal oral mucosa, PERRL, EOMI	  Lymphatic: No lymphadenopathy  Cardiovascular: Normal S1 S2, No JVD, No murmurs, No edema  Respiratory: Lungs clear to auscultation	  Psychiatry: A & O x 3, Mood & affect appropriate  Gastrointestinal:  Soft, Non-tender, + BS	  Skin: No rashes, No ecchymoses, No cyanosis	  Neurologic Exam:  Mental status - Awake, Alert, Oriented to person, hospital but not month, age. Speech fluent, naming and repetition intact. Follows some simple commands.     Cranial nerves: Right 2mm, Left 1mm reactive, surgical, no gaze pre fence, intact sensation, no apparent facial asymmetry     Motor - Slightly increased tone throughout, mild cogwheeling b/l, 4-/5 in RUE push/pull/, 4+/5 in LUE, 3/5 in RLE, 4+/5 in LLE    Sensation - Light touch intact b/l    DTR's - Deferred 2/2 focused exam    Coordination - Deferred 2/2 mental status    Gait and station - Untested 2/2 patient safety  Extremities: Normal range of motion, No clubbing, cyanosis or edema  Vascular: Peripheral pulses palpable 2+ bilaterally    TELEMETRY: SR	    ECG:  	  RADIOLOGY:  < from: MR Head No Cont (04.30.25 @ 12:54) >    ACC: [id number] EXAM:  MR BRAIN   ORDERED BY: FLORIAN PIPER     *** ADDENDUM # 1 ***    Addendum to report of examination performed 4/30/2025.    Question of additional subcentimeter focus of recent ischemia right   precentral gyrus, however without definitive corresponding ADC or FLAIR   abnormality, possibly artifactual. Corroborate with clinical findings.    Discussed with Dr. Barbosa at 2:30 PM the day of this exam.    --- End of Report ---    *** END OF ADDENDUM # 1 ***      PROCEDURE DATE:  04/30/2025          INTERPRETATION:  CLINICAL STATEMENT: CVA.    TECHNIQUE: Multiplanar multisequence noncontrast MRI of the brain,   including diffusion-weighted imaging. Multiple images degraded by motion,   limiting evaluation.  COMPARISON: CT brain 4/29/2025.    FINDINGS:    Corpus callosum, pituitary and pineal regions appear unremarkable. No   tonsillar herniation or evidence of marrow replacement process. Extensive   degenerative change visualized cervical spine.    CP angle and IAC regions are grossly unremarkable, as are the globes and   intraconal regions, with the exception of right lens replacement. No   paranasal sinus air-fluid levels or opacification is evident. Scattered   mucosal thickening. No evidence of mastoid effusion.    Age-appropriate atrophy with moderate confluent and scattered foci of   increased T2 and FLAIR signal periventricular and juxtacortical white   matter bilateral cerebral hemispheres; a nonspecific finding which   statistically reflects chronic microvascular ischemic change. Chronic   right parietal infarct. Approximate 1 cm nodular focus of susceptibility   left corona radiata, compatible with hemosiderin deposition versus   mineralization.    Serpiginous, cortically based foci of restricted diffusion with   associated T2-FLAIR prolongation left precentral gyrus and left   frontoparietal parasagittal region. No obvious hemorrhagic   transformation, however suboptimally evaluated secondary to motion.    IMPRESSION:    Limited by motion.    1. Acute-subacute cortical infarcts LEFT precentral gyrus and LEFT   frontoparietal parasagittal region.  2. Additional findings described in detail above.    --- End of Report ---    ***Please see the addendum at the top of this report. It may contain   additional important information or changes.****      < end of copied text >    < from: CT Angio Neck Stroke Protocol w/ IV Cont (04.29.25 @ 19:21) >    ACC: [id number] EXAM:  CT ANGIO BRAIN STROKE PROTC IC   ORDERED BY:   KEATON ULLOA     ACC: [id number] EXAM:  CT ANGIO NECK STROKE PROTCL IC   ORDERED BY:   KEATON ULLOA     ACC: [id number] EXAM:  CT BRAIN PERFUSION MAPS STROKE   ORDERED BY:   KEATON ULLOA     PROCEDURE DATE:  04/29/2025          INTERPRETATION:  CLINICAL INFORMATION: Stroke Code, BIBA from home as per   daughter right arm weakness and slurred speech since 9am but getting   worse during the day. Hx Dementia      COMPARISON: None.    CONTRAST:  IV Contrast: Omnipaque 350  130 cc administered  70 cc discarded    TECHNIQUE: CT perfusion and CTA of the head and neck were performed   following the intravenous administration of IV contrast. MIP   reconstructions were performed on a separate workstation and reviewed.   RAPID software was utilized for perfusion analysis.    FINDINGS:    CT PERFUSION:    TECHNICAL LIMITATIONS: Motion artifact    CBF<30%/CORE INFARCTION: 0 mL  TMAX>6s/UNDERPERFUSED TISSUE: 93 mL  MISMATCH VOLUME/TISSUE AT RISK: 93 mL  MISMATCH RATIO: Infinite    MISCELLANEOUS: None.      CTA NECK:    AORTIC ARCH AND VISUALIZED GREAT VESSELS: Within normal limits.    RIGHT:  COMMON CAROTID ARTERY: Severe calcified plaque at the carotid bifurcation.  INTERNAL CAROTID ARTERY: Suspected severe stenosis based on NASCET   criteria.  VERTEBRAL ARTERY: Normal in course and caliber to the intracranial   circulation. Dominant.    LEFT:  COMMON CAROTID ARTERY: Severe calcified plaque at the carotid bifurcation.  INTERNAL CAROTID ARTERY: Suspected severe stenosis based on NASCET   criteria.  VERTEBRAL ARTERY: Normal in course and caliber to the intracranial   circulation.    VISUALIZED LUNGS: Clear.    MISCELLANEOUS: None.    CAROTID STENOSIS REFERENCE: Percent (%) stenosis is expressed in terms of   NASCET Criteria. (NASCET = 100x1-(N/D)). N=greatest narrowing. D=normal   distal diameter - MILD = <50% stenosis. - MODERATE = 50-69% stenosis. -   SEVERE = 70-89% stenosis. - HAIRLINE/CRITICAL = 90-99% stenosis. -   OCCLUDED = 100% stenosis.      CTA HEAD:    INTERNAL CAROTID ARTERIES: Bilateral petrous, precavernous, cavernous,   and supraclinoid regions are patent without significant stenosis.    [place] OF DEGROOT: No aneurysm identified. Tiny aneurysms can bebeyond   the resolution of CTA technique.    ANTERIOR CEREBRAL ARTERIES: No significant stenosis or occlusion.  MIDDLE CEREBRAL ARTERIES: The proximal left M1 segment is not well   visualized and there is a tangle of small vessels in this region. Cannot   exclude moyamoya syndrome. Distal vasculature appears fairly symmetrical   compared to the right.  POSTERIOR CEREBRAL ARTERIES: No significant stenosis or occlusion.    DISTAL VERTEBRAL / BASILAR ARTERIES: Vascular calcifications in the right   vertebral artery.    VENOUS STRUCTURES: Visualized superficial and deep venous structures   appear patent.    MISCELLANEOUS: No other vascular abnormality is seen.      IMPRESSION:    CT PERFUSION:  Technical limitations: Motion artifact    Core infarction: 0 ml  Penumbra / tissue at risk for active ischemia: 93 mL    CTA NECK:  Severe bilateral calcified plaque.    CTA HEAD:  The proximal left M1 segment is not well visualized and there is a tangle   of small vessels in this region. Cannot exclude moyamoya syndrome. Distal   vasculature appears fairly symmetrical compared to the right. This is   likely chronic.    Findings were discussed with Dr. KEATON ULLOA [phone] 4/29/2025 7:43   PM by Dr. Christopher with read back confirmation.    --- End of Report ---      < end of copied text >    OTHER: 	< from: TTE W or WO Ultrasound Enhancing Agent (04.30.25 @ 14:20) >    TRANSTHORACIC ECHOCARDIOGRAM REPORT  ________________________________________________________________________________                                      _______       Pt. Name:       VIOLETTA WRIGHT Study Date:    4/30/2025  MRN:            [id number]      YOB: 1935  Accession #:    [id number]      Age:           89 years  Account#:       [id number]     Gender:        F  Heart Rate:                    Height:        62.00 in (157.48 cm)  Rhythm:                        Weight:   159.99 lb (72.57 kg)  Blood Pressure: 149/68 mmHg    BSA/BMI:       1.74 m² / 29.26 kg/m²  ________________________________________________________________________________________  Referring Physician:    [id number] James Davis  Interpreting Physician: Chong Khoury MD  Primary Sonographer:    Karan Garg RDCS    CPT:               ECHO TTE WO CON COMP W DOPP - 45424.m  Indication(s):     Cerebral infarction due to unspecified occlusion or stenosis                     of unspecified cerebral artery - I63.50  Procedure:         Transthoracic echocardiogram with 2-D, M-mode and complete                     spectral and color flow Doppler.  Ordering Location: [facility]  Admission Status:  Inpatient  Agitated Saline:   Injection with agitated saline was performed to evaluate for                     intracardiac shunting.    _______________________________________________________________________________________     CONCLUSIONS:      1. Left ventricular systolic function is normal with an ejection fraction of 66 % by Diehl's method of disks.   2. There is normal LV mass and concentric remodeling.   3. There is mild (grade 1) left ventricular diastolic dysfunction.   4. The right ventricle is not well visualized. probably normal right ventricular cavity size and probably normal right ventricular systolic function.   5. Estimated pulmonary artery systolic pressure is 43 mmHg, consistent with mild pulmonary hypertension.   6. Agitated saline injection was negative for intracardiac shunt.      < end of copied text >    	  LABS:	 	    CARDIAC MARKERS:                                  13.0   8.66  )-----------( 181      ( 06 May 2025 06:25 )             40.3     05-06    137  |  105  |  48[H]  ----------------------------<  109[H]  4.9   |  17[L]  |  1.25    Ca    9.3      06 May 2025 06:26  Phos  5.2     05-06  Mg     2.2     05-06    TPro  6.7  /  Alb  3.2[L]  /  TBili  0.5  /  DBili  x   /  AST  21  /  ALT  35  /  AlkPhos  94  05-05    proBNP:   Lipid Profile:   HgA1c:   TSH:

## 2025-05-06 NOTE — CONSULT NOTE ADULT - SUBJECTIVE AND OBJECTIVE BOX
EP Attending  HISTORY OF PRESENT ILLNESS: HPI:   89y Ambidextrous/ L hand dominant  Female with PMHx of  Dementia, Alport syndrome, HTN, HLD, Lumbar compression Fx, PD presented to [facility] ED on 4/29 for eval of Right sided weakness. LKWT 4/28 around 2300 and woke up on Tuesday (4/29) AM was unable to move her R side, appeared lethargic to the family and pt slept most of the morning until noon time. Pt noted to be more lethargic and generally weak post lunch yesterday, unable to move, slumped to her Right side, still responsive, talking to the family, no LOC/Fall but very weak and hence was brought to ED. Initial Non con CTH w/ no acute intracranial pathology, encephalomalacia and gliosis in R parietal lobe, CTA H/N w/some severe extracranial carotid stenosis 2/2 calcified plaque and C/F moyamoya syndrome given tangle of vessel appearance in LM1 region with symmetrical distal vasculature. Pt deemed not a candidate for any stroke intervention, no TNK 2/2 out of the time window, No EVT 2/2 no LVO in CTA.   Of note, pt had a fall last year, has R foot drop, and had a DVT last year per family. Pt normally ambulates slow but able to ambulate w/walker @ baseline, needs help with all ADLs.   Patient was then transferred to [facility] for NSGY evaluation for concern for moyamoya.  (05 May 2025 15:21)    Community-dwelling 89yo, here with family after wake-up stroke.  EP called re: AFib surveillance.  Patient has cognitive impairment/dementia with known limited orientation (A/O x 1-2?).  History of HTN, HLD on medical therapy.  No prior known arrhythmia diagnoses.  Not able to participate in full ROS due to dementia.    PAST MEDICAL & SURGICAL HISTORY:  Dementia  Alport syndrome  HTN (hypertension)  HLD (hyperlipidemia)  History of scoliosis  Lumbar compression fracture  H/O Parkinson's disease  S/P lumbar spine operation    MEDICATIONS  (STANDING):  ALPRAZolam 0.25 milliGRAM(s) Oral once  aspirin enteric coated 81 milliGRAM(s) Oral daily  atorvastatin 40 milliGRAM(s) Oral at bedtime  clopidogrel Tablet 75 milliGRAM(s) Oral daily  famotidine    Tablet 20 milliGRAM(s) Oral daily  gabapentin 300 milliGRAM(s) Oral at bedtime  gabapentin 100 milliGRAM(s) Oral daily  heparin   Injectable 5000 Unit(s) SubCutaneous every 12 hours  melatonin 3 milliGRAM(s) Oral at bedtime  QUEtiapine 25 milliGRAM(s) Oral at bedtime    Allergies    losartan (Angioedema)  penicillin (Rash)    Intolerances      FAMILY HISTORY:  Family history of deafness (Mother, Child, Grandparent, Aunt)    Non-contributary for premature coronary disease or sudden cardiac death    SOCIAL HISTORY:    [ x] Non-smoker  [ ] Smoker  [ ] Alcohol      PHYSICAL EXAM:  T(C): 37 (05-06-25 @ 08:00), Max: 37 (05-06-25 @ 08:00)  HR: 61 (05-06-25 @ 08:56) (59 - 92)  BP: 138/61 (05-06-25 @ 08:56) (106/56 - 155/70)  RR: 24 (05-06-25 @ 08:56) (12 - 24)  SpO2: 98% (05-06-25 @ 08:56) (95% - 99%)  Wt(kg): --    Appearance: Elderly woman in no acute distress	  HEENT:   Normal oral mucosa, PERRL, EOMI	  Lymphatic: No lymphadenopathy , no edema  Cardiovascular: Normal S1 S2, No JVD, No murmurs , Peripheral pulses palpable 2+ bilaterally  Respiratory: Lungs clear to auscultation, normal effort 	  Gastrointestinal:  Soft, Non-tender, + BS	  Skin: No rashes, No ecchymoses, No cyanosis, warm to touch  Musculoskeletal: Normal range of motion, normal strength  Psychiatry:  Mood & affect appropriate      TELEMETRY: NSR, no AFib	    ECG: NSR  Echo:  < from: TTE W or WO Ultrasound Enhancing Agent (04.30.25 @ 14:20) >     CONCLUSIONS:      1. Left ventricular systolic function is normal with an ejection fraction of 66 % by Diehl's method of disks.   2. There is normal LV mass and concentric remodeling.   3. There is mild (grade 1) left ventricular diastolic dysfunction.   4. The right ventricle is not well visualized. probably normal right ventricular cavity size and probably normal right ventricular systolic function.   5. Estimated pulmonary artery systolic pressure is 43 mmHg, consistent with mild pulmonary hypertension.   6. Agitated saline injection was negative for intracardiac shunt.    < end of copied text >    < from: CT Angio Neck Stroke Protocol w/ IV Cont (04.29.25 @ 19:21) >  MPRESSION:    CT PERFUSION:  Technical limitations: Motion artifact    Core infarction: 0 ml  Penumbra / tissue at risk for active ischemia: 93 mL    CTA NECK:  Severe bilateral calcified plaque.    CTA HEAD:  The proximal left M1 segment is not well visualized and there is a tangle   of small vessels in this region. Cannot exclude moyamoya syndrome. Distal   vasculature appears fairly symmetrical compared to the right. This is   likely chronic.    < end of copied text >    < from: MR Head No Cont (04.30.25 @ 12:54) >  1. Acute-subacute cortical infarcts LEFT precentral gyrus and LEFT   frontoparietal parasagittal region.  2. Additional findings described in detail above.    < end of copied text >  < from: MR Head No Cont (04.30.25 @ 12:54) >  Age-appropriate atrophy with moderate confluent and scattered foci of   increased T2 and FLAIR signal periventricular and juxtacortical white   matter bilateral cerebral hemispheres; a nonspecific finding which   statistically reflects chronic microvascular ischemic change. Chronic   right parietal infarct. Approximate 1 cm nodular focus of susceptibility   left corona radiata, compatible with hemosiderin deposition versus   mineralization.    Serpiginous, cortically based foci of restricted diffusion with   associated T2-FLAIR prolongation left precentral gyrus and left   frontoparietal parasagittal region. No obvious hemorrhagic   transformation, however suboptimally evaluated secondary to motion.    < end of copied text >  	  LABS:	 	                          13.0   8.66  )-----------( 181      ( 06 May 2025 06:25 )             40.3     05-06    137  |  105  |  48[H]  ----------------------------<  109[H]  4.9   |  17[L]  |  1.25    Ca    9.3      06 May 2025 06:26  Phos  5.2     05-06  Mg     2.2     05-06    TPro  6.7  /  Alb  3.2[L]  /  TBili  0.5  /  DBili  x   /  AST  21  /  ALT  35  /  AlkPhos  94  05-05    ASSESSMENT/PLAN: Ms Kwan is a 89y Female brought in from home by family after a wake-up stroke.  Being managed medically / outside of timeframe for interventions.  She has a history of HTN, and hyperlipidemia.  There is CT/MRI evidence of prior stroke.  Her echocardiogram is reassuring, normal EF, no patent foramen ovale.    Head and neck vasculature notable for severe bilateral carotid artery stenosis, and MoyaMoya like blood vessel changes bilaterally.  She is referred for a loop recorder specifically for surveillance for paroxysmal atrial fibrillation after stroke of unknown source.  Currently she is prescribed dual antiplatelet therapy for intracranial/extracranial peripheral arterial disease.  My question for the neurology team is whether she would be better served on eventual transition to anticoagulation with apixaban (after ILR-->AF diagnosis), or if she would do better on DAPT for PAD.  Pending further discussion and consolidation of the plan of care, could implant ILR as early as tomorrow if desired by team and family.  Followup of data would be w/ Dr Rose.  For now, continue dual antiplatelet therapy, and statin for secondary prevention of stroke.    Kailash Fong M.D.  Cardiac Electrophysiology    office [phone]  pager [phone]

## 2025-05-06 NOTE — OCCUPATIONAL THERAPY INITIAL EVALUATION ADULT - PERTINENT HX OF CURRENT PROBLEM, REHAB EVAL
89y Ambidextrous/ L hand dominant  Female with PMHx of  Dementia, Alport syndrome, HTN, HLD, Lumbar compression Fx, PD presented to [facility] ED on 4/29 for eval of Right sided weakness. LKWT 4/28 around 2300 and woke up on Tuesday (4/29) AM was unable to move her R side, appeared lethargic to the family and pt slept most of the morning until noon time. Pt noted to be more lethargic and generally weak post lunch yesterday, unable to move, slumped to her Right side, still responsive, talking to the family, no LOC/Fall but very weak and hence was brought to ED. Initial Non con CTH w/ no acute intracranial pathology, encephalomalacia and gliosis in R parietal lobe, CTA H/N w/some severe extracranial carotid stenosis 2/2 calcified plaque and C/F moyamoya syndrome given tangle of vessel appearance in LM1 region with symmetrical distal vasculature. Pt deemed not a candidate for any stroke intervention, no TNK 2/2 out of the time window, No EVT 2/2 no LVO in CTA. Of note, pt had a fall last year, has R foot drop, and had a DVT last year per family. Pt normally ambulates slow but able to ambulate w/walker @ baseline, needs help with all ADLs.   Patient was then transferred to [facility] for NSGY evaluation for concern for moyamoya.

## 2025-05-06 NOTE — OCCUPATIONAL THERAPY INITIAL EVALUATION ADULT - ADL RETRAINING, OT EVAL
Patient will dress lower body with minimal assistance, AE as needed in 2 weeks. Patient will dress upper body with minimal assistance in 2 weeks

## 2025-05-06 NOTE — OCCUPATIONAL THERAPY INITIAL EVALUATION ADULT - TRANSFER TRAINING, PT EVAL
Patient will transfer to toilet with DME as needed Min A in 2 weeks. Pt will perform SPT bed <-> chair min A, with AD as needed within 2 weeks

## 2025-05-06 NOTE — OCCUPATIONAL THERAPY INITIAL EVALUATION ADULT - PERSONAL SAFETY AND JUDGMENT, REHAB EVAL
decreased safety awareness during transfers, increasing risk of falls; pt reports fear of falling-pushing backwards at EOB/impaired/at risk behaviors demonstrated

## 2025-05-06 NOTE — PHYSICAL THERAPY INITIAL EVALUATION ADULT - STRENGTHENING, PT EVAL
GOAL: (to be met in 4 wks) increased RUE/RLE strength to 4/5 to decrease assistance with functional activities

## 2025-05-06 NOTE — OCCUPATIONAL THERAPY INITIAL EVALUATION ADULT - BALANCE TRAINING, PT EVAL
Pt will increase dynamic sitting balance to Fair to increase safety/independence with seated ADLs within 2 weeks

## 2025-05-06 NOTE — PHYSICAL THERAPY INITIAL EVALUATION ADULT - ADDITIONAL COMMENTS
lives with family in private house with entrance through garage then a chair lift to the pt's apartment in the house, ambulated with rolling walker, has wheelchair and commode

## 2025-05-06 NOTE — OCCUPATIONAL THERAPY INITIAL EVALUATION ADULT - PHYSICAL ASSIST/NONPHYSICAL ASSIST:DRESS LOWER BODY, OT EVAL
Chief complaint:   Chief Complaint   Patient presents with    Office Visit    Follow-up    Throat Problem       Vitals:  Visit Vitals  BP (!) 174/96 (BP Location: LUE - Left upper extremity, Patient Position: Sitting, Cuff Size: Large Adult)   Pulse (!) 105   Temp 98.1 °F (36.7 °C) (Temporal)   Resp 14   Ht 5' 11\" (1.803 m)   Wt (!) 147.5 kg (325 lb 1.9 oz)   LMP 02/28/2013   SpO2 93%   BMI 45.35 kg/m²       HISTORY OF PRESENT ILLNESS     Poorly controlled hypertension   GERD  Class 3 obesity    This is the case of a 55-year-old woman who had been followed by Dr. Rogers who presents into my practice once again out of concerns about her elevated blood pressure.  She acknowledged that she is eating high salt foods.  She has been taking her 3 antihypertensive medications (losartan, hydrochlorothiazide and amlodipine).  She denies having any chest pain, palpitations or lower extremity swelling.  Review of her diet as it relates to her elevated BMI shows that she tends to skip breakfast and eat a large and late dinner before going to bed.  She has not exercising on a regular basis stating that she is limited somewhat by her chronic lower back pain.  Recently she has been experiencing more reflux symptoms.    Other significant problems:  Patient Active Problem List    Diagnosis Date Noted    Encounter for preventive health examination 05/22/2024     Priority: Low    Type 2 diabetes mellitus with neurologic complication, without long-term current use of insulin  (CMD) 07/25/2023     Priority: Low    Trigeminal neuralgia 01/05/2023     Priority: Low    CAROL (obstructive sleep apnea) 12/12/2022     Priority: Low    Spondylosis of lumbar region without myelopathy or radiculopathy 07/29/2019     Priority: Low    Bipolar 1 disorder, depressed, severe (CMS/HCC) with psychotic features 05/01/2019     Priority: Low    Anxiety 05/01/2019     Priority: Low    Displaced subtrochanteric fracture of left femur, subsequent encounter for  closed fracture with routine healing 02/04/2018     Priority: Low    Closed displaced bicondylar fracture of left tibia with routine healing 02/04/2018     Priority: Low    Lumbosacral plexus lesion 02/04/2018     Priority: Low    Depression      Priority: Low    Migraine without aura, without mention of intractable migraine without mention of status migrainosus 11/06/2013     Priority: Low    Lumbago 11/06/2013     Priority: Low    Obese 10/09/2013     Priority: Low    Essential hypertension 08/23/2013     Priority: Low     Referred to Family Medicine      Follow-up examination, following other surgery 04/26/2013     Priority: Low       PAST MEDICAL, FAMILY AND SOCIAL HISTORY     Medications:  Current Outpatient Medications   Medication Sig Dispense Refill    pantoprazole (PROTONIX) 40 MG tablet Take 1 tablet by mouth daily. 90 tablet 1    losartan (COZAAR) 50 MG tablet Take 1 tablet by mouth daily. 90 tablet 3    venlafaxine XR (EFFEXOR XR) 75 MG 24 hr capsule Take 1 capsule by mouth daily. 30 capsule 5    ziprasidone (GEODON) 40 MG capsule Take 1 capsule by mouth in the morning and 1 capsule in the evening. Take with meals. 60 capsule 5    carbamazepine (TEGRETOL XR) 400 MG 12 hr tablet Take 1 tablet by mouth in the morning and 1 tablet in the evening. 180 tablet 1    hydroCHLOROthiazide 25 MG tablet Take 1 tablet by mouth daily. 90 tablet 3    amLODIPine (NORVASC) 10 MG tablet Take 1 tablet by mouth daily. 90 tablet 1    Multiple Vitamins-Minerals (vitamin - therapeutic multivitamins w/minerals) tablet Take 1 tablet by mouth daily. 30 tablet 1    diclofenac (VOLTAREN) 1 % gel Apply 4 g topically 4 times daily as needed (left thumb pain). 150 g 1    metFORMIN (GLUCOPHAGE-XR) 500 MG 24 hr tablet TAKE 1 TABLET BY MOUTH DAILY WITH BREAKFAST 90 tablet 1    gabapentin (NEURONTIN) 100 MG capsule Take 1 capsule by mouth in the morning and 1 capsule at noon and 1 capsule in the evening. 90 capsule 11    electrolyte/PEG  3350 (Nulytely with Flavor Packs) 420 g solution TAKE AS DIRECTED AS INDICATED IN COLONOSCOPY PATIENT INSTRUCTION LETTER 4000 mL 0    traMADol (ULTRAM) 50 MG tablet Take 1 tablet by mouth every 8 hours as needed for Pain. 30 tablet 0    tiZANidine (ZANAFLEX) 2 MG tablet Take 1 tablet by mouth every 8 hours as needed for Muscle spasms. 20 tablet 0    aspirin-acetaminophen-caffeine (EXCEDRIN MIGRAINE) 250-250-65 MG per tablet Take 1 tablet by mouth every 6 hours as needed for Pain. 30 tablet 0    Vitamin D, Ergocalciferol, 72772 units capsule Take 1 capsule by mouth once a week. 4 capsule 3    acetaminophen (MAPAP) 325 MG tablet Take 2 tablets by mouth every 4 hours as needed for Pain. 30 tablet 0    sumatriptan (IMITREX) 50 MG tablet Take 1 tablet by mouth at onset of migraine. May repeat after 2 hours if needed. 9 tablet 1    polyethylene glycol (MIRALAX) powder Take 17 g by mouth daily. 255 g 12     No current facility-administered medications for this visit.       Allergies:  ALLERGIES:  No Known Allergies    Past Medical  History/Surgeries:  Past Medical History:   Diagnosis Date    Anemia     Back pain     Bipolar disorder  (CMD)     Closed displaced bicondylar fracture of left tibia with routine healing 02/04/2018    Depression     Displaced subtrochanteric fracture of left femur, subsequent encounter for closed fracture with routine healing 02/04/2018    SHAY (generalized anxiety disorder)     Hypertension     Lumbosacral plexus lesion 02/04/2018    Ovarian cyst  removed 2003    PTSD (post-traumatic stress disorder)     Sleep apnea     Spondylosis of lumbar region without myelopathy or radiculopathy 07/29/2019    Vitamin D deficiency        Past Surgical History:   Procedure Laterality Date    Hysterectomy      Laparoscopic supracervical hysterectomy  3/28/2013    menorrhagia secondary to fibroids    Ovarian cyst removal  2003    Laparoscopic    Pelvic laparoscopy  4/16/13    dx laparoscopy and application of  hemostatic agents and vaginal packing for postop bleeding    Tubal ligation  1998       Family History:  Family History   Problem Relation Age of Onset    Cancer Mother     Cancer Father     Stroke/TIA Daughter     Seizure Disorder Daughter 1    Cancer Maternal Aunt     Hypertension Maternal Aunt     Hypertension Maternal Uncle        Social History:  Social History     Tobacco Use    Smoking status: Never    Smokeless tobacco: Never   Substance Use Topics    Alcohol use: No     Alcohol/week: 0.0 standard drinks of alcohol     Comment: occasionally       REVIEW OF SYSTEMS     Review of Systems    PHYSICAL EXAM     Physical Exam  General:  Alert and oriented x3 and in no distress.    Cardiovascular:  The heart exam revealed a regular rate and rhythm with a normal S1 and S2.  No murmur appreciated.  No gallop noted.  No distal pretibial edema.  ASSESSMENT/PLAN     Poorly controlled hypertension:  Emphasized proper adherence and salt avoidance.  She wished to be given 1 more chance and return for blood pressure recheck while on all her meds and observing a low-salt diet.  She was also agreeable to an increase of the losartan from 50 mg to 100 mg daily.  GERD:  Probably at risk because of significant truncal obesity and eating of large meals at 1 time.  Will need to avoid eating in this manner and she was encouraged to eat both breakfast and lunch and eat an earlier dinner.  She was counseled about taking the pantoprazole that I am prescribing 1st thing in the morning on an empty stomach.    Class 3 obesity:  As mentioned above regarding the timing of meals including choosing a low-carbohydrate diet and low glycemic index fruits along with becoming more physically active to help improve her BMI.      Follow up with Dr. Cabezas in 6 weeks.    Mainor Henson MD  06/14/2024   verbal cues/nonverbal cues (demo/gestures)/1 person assist

## 2025-05-07 PROCEDURE — 99222 1ST HOSP IP/OBS MODERATE 55: CPT

## 2025-05-07 RX ADMIN — CLOPIDOGREL BISULFATE 75 MILLIGRAM(S): 75 TABLET, FILM COATED ORAL at 11:46

## 2025-05-07 RX ADMIN — QUETIAPINE FUMARATE 25 MILLIGRAM(S): 25 TABLET ORAL at 21:05

## 2025-05-07 RX ADMIN — HEPARIN SODIUM 5000 UNIT(S): 1000 INJECTION INTRAVENOUS; SUBCUTANEOUS at 17:44

## 2025-05-07 RX ADMIN — ATORVASTATIN CALCIUM 40 MILLIGRAM(S): 80 TABLET, FILM COATED ORAL at 21:05

## 2025-05-07 RX ADMIN — GABAPENTIN 300 MILLIGRAM(S): 400 CAPSULE ORAL at 21:05

## 2025-05-07 RX ADMIN — Medication 3 MILLIGRAM(S): at 21:05

## 2025-05-07 RX ADMIN — Medication 20 MILLIGRAM(S): at 11:47

## 2025-05-07 RX ADMIN — Medication 81 MILLIGRAM(S): at 11:47

## 2025-05-07 RX ADMIN — GABAPENTIN 100 MILLIGRAM(S): 400 CAPSULE ORAL at 11:46

## 2025-05-07 RX ADMIN — HEPARIN SODIUM 5000 UNIT(S): 1000 INJECTION INTRAVENOUS; SUBCUTANEOUS at 05:56

## 2025-05-07 NOTE — PROGRESS NOTE ADULT - ASSESSMENT
89y Ambidextrous/ L hand dominant  Female with PMHx of  Dementia, Alport syndrome, HTN, HLD, Lumbar compression Fx, PD presented to [facility] ED on 4/29 for eval of Right sided weakness. LKWT 4/28 around 2300 and woke up on Tuesday (4/29) AM was unable to move her R side, appeared lethargic to the family and pt slept most of the morning until noon time. Pt noted to be more lethargic and generally weak post lunch yesterday, unable to move, slumped to her Right side, still responsive, talking to the family, no LOC/Fall but very weak and hence was brought to ED. Initial Non con CTH w/ no acute intracranial pathology, encephalomalacia and gliosis in R parietal lobe, CTA H/N w/some severe extracranial carotid stenosis 2/2 calcified plaque and C/F moyamoya syndrome given tangle of vessel appearance in LM1 region with symmetrical distal vasculature. Pt deemed not a candidate for any stroke intervention, no TNK 2/2 out of the time window, No EVT 2/2 no LVO in CTA.   Of note, pt had a fall last year, has R foot drop, and had a DVT last year per family. Pt normally ambulates slow but able to ambulate w/walker @ baseline, needs help with all ADLs.   Patient was then transferred to [facility] for NSGY evaluation for concern for moyamoya.

## 2025-05-07 NOTE — PROGRESS NOTE ADULT - ASSESSMENT
ASSESSMENT:     Impression:    NEURO: Continue close monitoring for neurologic deterioration, permissive HTN, titrate statin to LDL goal less than 70, MRI Brain w/o, MRA Head w/o and Neck w/contrast. Physical therapy/OT/Speech eval/treatment.     ANTITHROMBOTIC THERAPY:     PULMONARY: CXR clear, protecting airway, saturating well     CARDIOVASCULAR: check TTE, cardiac monitoring                              SBP goal:     GASTROINTESTINAL:  dysphagia screen       Diet:     RENAL: BUN/Cr stable, good urine output      Na Goal: Greater than 135     Almaraz:    HEMATOLOGY: H/H stable, Platelets normal. BLE dopplers 5/1: no acute dvt.     DVT ppx: Heparin s.c [] LMWH []     ID: afebrile, no leukocytosis     OTHER: Case and plan discussed with daughter at bedside, all questions were addressed.    DISPOSITION: Rehab or home depending on PT eval once stable and workup is complete      CORE MEASURES:        Admission NIHSS: 14     TPA: [] YES [x] NO      LDL/HDL: 61/45     Depression Screen: pass     Statin Therapy:     Dysphagia Screen: [x] PASS [] FAIL     Smoking [] YES [x] NO      Afib [] YES [x] NO     Stroke Education [x] YES [] NO    Obtain screening lower extremity venous ultrasound in patients who meet 1 or more of the following criteria as patient is high risk for DVT/PE on admission:   [] History of DVT/PE  []Hypercoagulable states (Factor V Leiden, Cancer, OCP, etc. )  []Prolonged immobility (hemiplegia/hemiparesis/post operative or any other extended immobilization)  [] Transferred from outside facility (Rehab or Long term care)  [] Age </= to 50 ASSESSMENT: 89y Ambidextrous/ L hand dominant  Female with PMHx of  Dementia, Alport syndrome, HTN, HLD, Lumbar compression Fx, PD presented to [facility] ED on 4/29 for eval of Right sided weakness. LKWT 4/28 around 2300 and woke up on Tuesday (4/29) AM was unable to move her R side, appeared lethargic to the family and pt slept most of the morning until noon time. Initial Non con CTH w/ no acute intracranial pathology, encephalomalacia and gliosis in R parietal lobe, CTA H/N w/some severe extracranial carotid stenosis 2/2 calcified plaque and C/F moyamoya syndrome given tangle of vessel appearance in LM1 region with symmetrical distal vasculature. Pt deemed not a candidate for any stroke intervention, no TNK 2/2 out of the time window, No EVT 2/2 no LVO in CTA.     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 Continue close monitoring for neurologic deterioration. Keep permissive HTN to gradual normotension. LDL 61 - continue home statin as LDL is below goal. MRI Brain w/o, MRA Head w/o, results as above. Carotid Duplex- severe >70% stenosis of B/L ICA. No need for MRA NOVA as will not . Physical therapy/Occupational therapy recommend [facility].     ANTITHROMBOTIC THERAPY: Aspirin 81mg + Plavix 75mg daily for 21 days, followed by ASA monotherapy per chance protocol    PULMONARY: Protecting airway, saturating well     CARDIOVASCULAR: TTE: EF 66% normal LA. Continue cardiac monitoring, no events. Cardiology recs appreciated. Recommend ILR or external cardiac monitoring outpatient to screen for arrhythmia.       SBP goal: < 140    GASTROINTESTINAL:  dysphagia screen passed, tolerating diet       Diet: regular    RENAL: BUN/Cr stable, good urine output      Na Goal: Greater than 135     Almaraz: no    HEMATOLOGY: H/H stable, Platelets 181. BLE dopplers 5/1: no acute dvt.     DVT ppx: Heparin s.c [x] LMWH []     ID: afebrile, no leukocytosis     OTHER: Case and plan discussed with daughter at bedside, all questions were addressed.    DISPOSITION: [facility] per PT/OT eval once bed available      CORE MEASURES:        Admission NIHSS: 14     TPA: [] YES [x] NO      LDL/HDL: 61/45     Depression Screen: pass     Statin Therapy:     Dysphagia Screen: [x] PASS [] FAIL     Smoking [] YES [x] NO      Afib [] YES [x] NO     Stroke Education [x] YES [] NO    Obtain screening lower extremity venous ultrasound in patients who meet 1 or more of the following criteria as patient is high risk for DVT/PE on admission:   [] History of DVT/PE  []Hypercoagulable states (Factor V Leiden, Cancer, OCP, etc. )  []Prolonged immobility (hemiplegia/hemiparesis/post operative or any other extended immobilization)  [] Transferred from outside facility (Rehab or Long term care)  [] Age </= to 50

## 2025-05-07 NOTE — CONSULT NOTE ADULT - SUBJECTIVE AND OBJECTIVE BOX
Patient is a 89y old  Female who presents with a chief complaint of Left precentral gyrus stroke (07 May 2025 09:39)    Admission HPI:   89y Ambidextrous/ L hand dominant  Female with PMHx of  Dementia, Alport syndrome, HTN, HLD, Lumbar compression Fx, PD presented to [facility] ED on 4/29 for eval of Right sided weakness. LKWT 4/28 around 2300 and woke up on Tuesday (4/29) AM was unable to move her R side, appeared lethargic to the family and pt slept most of the morning until noon time. Pt noted to be more lethargic and generally weak post lunch yesterday, unable to move, slumped to her Right side, still responsive, talking to the family, no LOC/Fall but very weak and hence was brought to ED. Initial Non con CTH w/ no acute intracranial pathology, encephalomalacia and gliosis in R parietal lobe, CTA H/N w/some severe extracranial carotid stenosis 2/2 calcified plaque and C/F moyamoya syndrome given tangle of vessel appearance in LM1 region with symmetrical distal vasculature. Pt deemed not a candidate for any stroke intervention, no TNK 2/2 out of the time window, No EVT 2/2 no LVO in CTA.   Of note, pt had a fall last year, has R foot drop, and had a DVT last year per family. Pt normally ambulates slow but able to ambulate w/walker @ baseline, needs help with all ADLs.   Patient was then transferred to [facility] for NSGY evaluation for concern for moyamoya.  (05 May 2025 15:21)    Interval History:  Patient had imaging:  CT Brain Stroke Protocol (04.29.25 @ 19:03) >  IMPRESSION:  No acute intracranial hemorrhage, mass effect, or midline shift.    MR Angio Head No Cont (05.06.25 @ 13:03) >  1. ANTERIOR CIRCULATION:   Subtotal occlusion left internal carotid   artery. Left anterior cerebral artery are perfused via right with   collateral circulation from the right internal carotid artery. Left   middle cerebral artery appears patent but markedly attenuated.   Intracranial atherosclerosis right internal carotid artery, mild to   moderate.    2. POSTERIOR CIRCULATION:  Intracranial atherosclerosis dominant right   vertebral artery, at least moderate, and left posterior cerebral arterial   P1 segment, mild-to-moderate    3. Patient motion limited scan. Requested MR angiography of the carotid   could not be performed.    Patient with functional deficits.     REVIEW OF SYSTEMS: No chest pain, shortness of breath, nausea, vomiting or diarhea; other ROS neg     PAST MEDICAL & SURGICAL HISTORY  Dementia    Alport syndrome    HTN (hypertension)    HLD (hyperlipidemia)    History of scoliosis    Lumbar compression fracture    H/O Parkinson's disease    S/P lumbar spine operation    FUNCTIONAL HISTORY:   Lives w family in home w stairlift.   Amb w RW    CURRENT FUNCTIONAL STATUS:  Mod A transfers    FAMILY HISTORY   Family history of deafness (Mother, Child, Grandparent, Aunt)    MEDICATIONS   acetaminophen     Tablet .. 650 milliGRAM(s) Oral every 6 hours PRN  aspirin enteric coated 81 milliGRAM(s) Oral daily  atorvastatin 40 milliGRAM(s) Oral at bedtime  clopidogrel Tablet 75 milliGRAM(s) Oral daily  famotidine    Tablet 20 milliGRAM(s) Oral daily  gabapentin 300 milliGRAM(s) Oral at bedtime  gabapentin 100 milliGRAM(s) Oral daily  heparin   Injectable 5000 Unit(s) SubCutaneous every 12 hours  lidocaine   4% Patch 1 Patch Transdermal daily PRN  melatonin 3 milliGRAM(s) Oral at bedtime  QUEtiapine 25 milliGRAM(s) Oral at bedtime    ALLERGIES  losartan (Angioedema)  penicillin (Rash)    VITALS  T(C): 36.5 (05-07-25 @ 08:49), Max: 36.7 (05-06-25 @ 21:08)  HR: 61 (05-07-25 @ 08:49) (56 - 79)  BP: 125/63 (05-07-25 @ 08:49) (93/50 - 143/82)  RR: 18 (05-07-25 @ 08:49) (18 - 18)  SpO2: 95% (05-07-25 @ 08:49) (93% - 98%)  Wt(kg): --    PHYSICAL EXAM  Constitutional - NAD, Comfortable  HEENT - NCAT, EOMI  Neck - Supple  Chest - No distress, no use of accessory muscles for respiration  Cardiovascular -Well perfused  Abdomen - BS+, Soft, NTND  Extremities - No C/C/E, No calf tenderness   Neurologic Exam -                    Cognitive - Awake, Alert, AAO to self, place, date, year, situation     Communication - Fluent, No dysarthria, no aphasia     Cranial Nerves - CN 2-12 intact     Motor - No focal deficits      Sensory - Intact to LT     Reflexes - DTR Intact, No primitive reflexive  Psychiatric - Mood stable, Affect WNL    RECENT LABS/IMAGING  CBC Full  -  ( 06 May 2025 06:25 )  WBC Count : 8.66 K/uL  RBC Count : 4.35 M/uL  Hemoglobin : 13.0 g/dL  Hematocrit : 40.3 %  Platelet Count - Automated : 181 K/uL  Mean Cell Volume : 92.6 fl  Mean Cell Hemoglobin : 29.9 pg  Mean Cell Hemoglobin Concentration : 32.3 g/dL  Auto Neutrophil # : x  Auto Lymphocyte # : x  Auto Monocyte # : x  Auto Eosinophil # : x  Auto Basophil # : x  Auto Neutrophil % : x  Auto Lymphocyte % : x  Auto Monocyte % : x  Auto Eosinophil % : x  Auto Basophil % : x    05-06    137  |  105  |  48[H]  ----------------------------<  109[H]  4.9   |  17[L]  |  1.25    Ca    9.3      06 May 2025 06:26  Phos  5.2     05-06  Mg     2.2     05-06      Urinalysis Basic - ( 06 May 2025 06:26 )    Color: x / Appearance: x / SG: x / pH: x  Gluc: 109 mg/dL / Ketone: x  / Bili: x / Urobili: x   Blood: x / Protein: x / Nitrite: x   Leuk Esterase: x / RBC: x / WBC x   Sq Epi: x / Non Sq Epi: x / Bacteria: x    Impression:  89 yo with functional deficits secondary to diagnosis of CVA    Plan:  PT- ROM, Bed Mob, Transfers, Amb w AD and bracing as needed  OT- ADLs, bracing  SLP- Dysphagia eval and treat  Prec- Falls, Cardiac  DVT Prophylaxis - SCDs  Skin- Turn q2 h  Dispo-     Total time taken to review relevant records and imaging results, examine patient, write note, and, when applicable, discuss case with patient, family, , resident, medical student and other medical providers:     [  ] 40 minutes (97716)  [  ] 55 minutes ([id number])  [  ] 75 minutes ([id number])    [  ] 25 minutes (42672)  [  ] 35 minutes (17530)  [  ] 50 minutes ([id number])           Patient is a 89y old  Female who presents with a chief complaint of Left precentral gyrus stroke (07 May 2025 09:39)    Admission HPI:   89y Ambidextrous/ L hand dominant  Female with PMHx of  Dementia, Alport syndrome, HTN, HLD, Lumbar compression Fx, PD presented to [facility] ED on 4/29 for eval of Right sided weakness. LKWT 4/28 around 2300 and woke up on Tuesday (4/29) AM was unable to move her R side, appeared lethargic to the family and pt slept most of the morning until noon time. Pt noted to be more lethargic and generally weak post lunch yesterday, unable to move, slumped to her Right side, still responsive, talking to the family, no LOC/Fall but very weak and hence was brought to ED. Initial Non con CTH w/ no acute intracranial pathology, encephalomalacia and gliosis in R parietal lobe, CTA H/N w/some severe extracranial carotid stenosis 2/2 calcified plaque and C/F moyamoya syndrome given tangle of vessel appearance in LM1 region with symmetrical distal vasculature. Pt deemed not a candidate for any stroke intervention, no TNK 2/2 out of the time window, No EVT 2/2 no LVO in CTA.   Of note, pt had a fall last year, has R foot drop, and had a DVT last year per family. Pt normally ambulates slow but able to ambulate w/walker @ baseline, needs help with all ADLs.   Patient was then transferred to [facility] for NSGY evaluation for concern for moyamoya.  (05 May 2025 15:21)    Interval History:  Patient had imaging:  CT Brain Stroke Protocol (04.29.25 @ 19:03) >  IMPRESSION:  No acute intracranial hemorrhage, mass effect, or midline shift.    MR Angio Head No Cont (05.06.25 @ 13:03) >  1. ANTERIOR CIRCULATION:   Subtotal occlusion left internal carotid   artery. Left anterior cerebral artery are perfused via right with   collateral circulation from the right internal carotid artery. Left   middle cerebral artery appears patent but markedly attenuated.   Intracranial atherosclerosis right internal carotid artery, mild to   moderate.    2. POSTERIOR CIRCULATION:  Intracranial atherosclerosis dominant right   vertebral artery, at least moderate, and left posterior cerebral arterial   P1 segment, mild-to-moderate    3. Patient motion limited scan. Requested MR angiography of the carotid   could not be performed.    Patient with functional deficits.     Patient's daughter at bedside to provide translation.     REVIEW OF SYSTEMS:R weakness, + difficutly walking, No chest pain, shortness of breath, nausea, vomiting or diarhea; other ROS neg     PAST MEDICAL & SURGICAL HISTORY  Dementia    Alport syndrome    HTN (hypertension)    HLD (hyperlipidemia)    History of scoliosis    Lumbar compression fracture    H/O Parkinson's disease    S/P lumbar spine operation    FUNCTIONAL HISTORY:   Lives w family in home w stairlift.   Amb w RW    CURRENT FUNCTIONAL STATUS:  Mod A transfers    FAMILY HISTORY   Family history of deafness (Mother, Child, Grandparent, Aunt)    MEDICATIONS   acetaminophen     Tablet .. 650 milliGRAM(s) Oral every 6 hours PRN  aspirin enteric coated 81 milliGRAM(s) Oral daily  atorvastatin 40 milliGRAM(s) Oral at bedtime  clopidogrel Tablet 75 milliGRAM(s) Oral daily  famotidine    Tablet 20 milliGRAM(s) Oral daily  gabapentin 300 milliGRAM(s) Oral at bedtime  gabapentin 100 milliGRAM(s) Oral daily  heparin   Injectable 5000 Unit(s) SubCutaneous every 12 hours  lidocaine   4% Patch 1 Patch Transdermal daily PRN  melatonin 3 milliGRAM(s) Oral at bedtime  QUEtiapine 25 milliGRAM(s) Oral at bedtime    ALLERGIES  losartan (Angioedema)  penicillin (Rash)    VITALS  T(C): 36.5 (05-07-25 @ 08:49), Max: 36.7 (05-06-25 @ 21:08)  HR: 61 (05-07-25 @ 08:49) (56 - 79)  BP: 125/63 (05-07-25 @ 08:49) (93/50 - 143/82)  RR: 18 (05-07-25 @ 08:49) (18 - 18)  SpO2: 95% (05-07-25 @ 08:49) (93% - 98%)  Wt(kg): --    PHYSICAL EXAM  Constitutional - NAD, Comfortable  HEENT - NCAT, EOMI  Neck - Supple  Chest - No distress, no use of accessory muscles for respiration  Cardiovascular -Well perfused  Abdomen - BS+, Soft, NTND  Extremities - No C/C/E, No calf tenderness   Neurologic Exam -                 Initially lethargic but arousable  Motor - L side 3-4/5, R side 2/5  Per dtr at baseline hypophonic slightly confused speech  Psychiatric - Mood stable, Affect WNL    RECENT LABS/IMAGING  CBC Full  -  ( 06 May 2025 06:25 )  WBC Count : 8.66 K/uL  RBC Count : 4.35 M/uL  Hemoglobin : 13.0 g/dL  Hematocrit : 40.3 %  Platelet Count - Automated : 181 K/uL  Mean Cell Volume : 92.6 fl  Mean Cell Hemoglobin : 29.9 pg  Mean Cell Hemoglobin Concentration : 32.3 g/dL  Auto Neutrophil # : x  Auto Lymphocyte # : x  Auto Monocyte # : x  Auto Eosinophil # : x  Auto Basophil # : x  Auto Neutrophil % : x  Auto Lymphocyte % : x  Auto Monocyte % : x  Auto Eosinophil % : x  Auto Basophil % : x    05-06    137  |  105  |  48[H]  ----------------------------<  109[H]  4.9   |  17[L]  |  1.25    Ca    9.3      06 May 2025 06:26  Phos  5.2     05-06  Mg     2.2     05-06      Urinalysis Basic - ( 06 May 2025 06:26 )    Color: x / Appearance: x / SG: x / pH: x  Gluc: 109 mg/dL / Ketone: x  / Bili: x / Urobili: x   Blood: x / Protein: x / Nitrite: x   Leuk Esterase: x / RBC: x / WBC x   Sq Epi: x / Non Sq Epi: x / Bacteria: x    Impression:  89 yo with functional deficits secondary to diagnosis of CVA    Plan:  PT- ROM, Bed Mob, Transfers, Amb w AD and bracing as needed  OT- ADLs, bracing  SLP- Dysphagia eval and treat  Prec- Falls, Cardiac  DVT Prophylaxis - SCDs  Skin- Turn q2 h  Dispo- RAMESH- can not tolerate three hours of rehab per day and will require a longer length of rehabilitation stay.     Total time taken to review relevant records and imaging results, examine patient, write note, and, when applicable, discuss case with patient, family, , resident, medical student and other medical providers:     [  ] 40 minutes (43914)  [X] 55 minutes ([id number])  [  ] 75 minutes ([id number])    [  ] 25 minutes (05719)  [  ] 35 minutes (32468)  [  ] 50 minutes (51743)

## 2025-05-07 NOTE — PROGRESS NOTE ADULT - SUBJECTIVE AND OBJECTIVE BOX
THE PATIENT WAS SEEN AND EXAMINED BY ME WITH THE HOUSESTAFF AND STROKE TEAM DURING MORNING ROUNDS.   HPI: 89y Ambidextrous/ L hand dominant  Female with PMHx of  Dementia, Alport syndrome, HTN, HLD, Lumbar compression Fx, PD presented to [facility] ED on 4/29 for eval of Right sided weakness. LKWT 4/28 around 2300 and woke up on Tuesday (4/29) AM was unable to move her R side, appeared lethargic to the family and pt slept most of the morning until noon time. Pt noted to be more lethargic and generally weak post lunch yesterday, unable to move, slumped to her Right side, still responsive, talking to the family, no LOC/Fall but very weak and hence was brought to ED. Initial Non con CTH w/ no acute intracranial pathology, encephalomalacia and gliosis in R parietal lobe, CTA H/N w/some severe extracranial carotid stenosis 2/2 calcified plaque and C/F moyamoya syndrome given tangle of vessel appearance in LM1 region with symmetrical distal vasculature. Pt deemed not a candidate for any stroke intervention, no TNK 2/2 out of the time window, No EVT 2/2 no LVO in CTA.   Of note, pt had a fall last year, has R foot drop, and had a DVT last year per family. Pt normally ambulates slow but able to ambulate w/walker @ baseline, needs help with all ADLs.   Patient was then transferred to [facility] for NSGY evaluation for concern for moyamoya.  (05 May 2025 15:21)      SUBJECTIVE: Additional seroquel given overnight.  No new neurologic complaints.  ROS negative unless otherwise noted.    acetaminophen     Tablet .. 650 milliGRAM(s) Oral every 6 hours PRN  aspirin enteric coated 81 milliGRAM(s) Oral daily  atorvastatin 40 milliGRAM(s) Oral at bedtime  clopidogrel Tablet 75 milliGRAM(s) Oral daily  famotidine    Tablet 20 milliGRAM(s) Oral daily  gabapentin 300 milliGRAM(s) Oral at bedtime  gabapentin 100 milliGRAM(s) Oral daily  heparin   Injectable 5000 Unit(s) SubCutaneous every 12 hours  lidocaine   4% Patch 1 Patch Transdermal daily PRN  melatonin 3 milliGRAM(s) Oral at bedtime  QUEtiapine 25 milliGRAM(s) Oral at bedtime      PHYSICAL EXAM:   Vital Signs Last 24 Hrs  T(C): 36.5 (07 May 2025 08:49), Max: 36.7 (06 May 2025 21:08)  T(F): 97.7 (07 May 2025 08:49), Max: 98.1 (06 May 2025 21:08)  HR: 61 (07 May 2025 08:49) (56 - 79)  BP: 125/63 (07 May 2025 08:49) (93/50 - 143/82)  BP(mean): --  RR: 18 (07 May 2025 08:49) (18 - 18)  SpO2: 95% (07 May 2025 08:49) (93% - 98%)  Patient On (Oxygen Delivery Method): room air        General: No acute distress  HEENT: EOM intact, visual fields full  Abdomen: Soft, nontender, nondistended   Extremities: No edema    NEUROLOGICAL EXAM:  Mental status: Awake, alert, oriented x2. speech fluent, naming and repetition intact, follows simple commands  Cranial Nerves: No facial asymmetry, no gaze preference  Motor exam: Increased tone throughout. mild cogwheeling B/L. 4-/5 RUE, 5/5 RLE, 4+/5 LUE, 4+/5 LLE  Sensation: Intact to light touch   Coordination/ Gait: No dysmetria    LABS:                        13.0   8.66  )-----------( 181      ( 06 May 2025 06:25 )             40.3    05-06    137  |  105  |  48[H]  ----------------------------<  109[H]  4.9   |  17[L]  |  1.25    Ca    9.3      06 May 2025 06:26  Phos  5.2     05-06  Mg     2.2     05-06          IMAGING: Reviewed by me.   MRA Head 5/6:   1. ANTERIOR CIRCULATION:   Subtotal occlusion left internal carotid artery. Left anterior cerebral artery are perfused via right with collateral circulation from the right internal carotid artery. Left middle cerebral artery appears patent but markedly attenuated. Intracranial atherosclerosis right internal carotid artery, mild to moderate.  2. POSTERIOR CIRCULATION:  Intracranial atherosclerosis dominant right vertebral artery, at least moderate, and left posterior cerebral arterial P1 segment, mild-to-moderate    MR Brain 4/30/25:  1. Acute-subacute cortical infarcts LEFT precentral gyrus and LEFT frontoparietal parasagittal region.  2. Additional findings described in detail above.  Addendum: Question of additional subcentimeter focus of recent ischemia right precentral gyrus, however without definitive corresponding ADC or FLAIR abnormality, possibly artifactual. Corroborate with clinical findings.      4/29/25  CTH: No mass effect, acute hemorrhage, or midline shift. There are periventricular and subcortical white matter hypodensities, consistent with microvascular type changes. Encephalomalacia and gliosis right parietal lobe.    CT PERFUSION:  Technical limitations: Motion artifact  Core infarction: 0 ml  Penumbra / tissue at risk for active ischemia: 93 mL    CTA NECK: Severe bilateral calcified plaque.    CTA HEAD: The proximal left M1 segment is not well visualized and there is a tangle of small vessels in this region. Cannot exclude moyamoya syndrome. Distal vasculature appears fairly symmetrical compared to the right. This is likely chronic.     THE PATIENT WAS SEEN AND EXAMINED BY ME WITH THE HOUSESTAFF AND STROKE TEAM DURING MORNING ROUNDS.   HPI: 89y Ambidextrous/ L hand dominant  Female with PMHx of  Dementia, Alport syndrome, HTN, HLD, Lumbar compression Fx, PD presented to [facility] ED on 4/29 for eval of Right sided weakness. LKWT 4/28 around 2300 and woke up on Tuesday (4/29) AM was unable to move her R side, appeared lethargic to the family and pt slept most of the morning until noon time. Pt noted to be more lethargic and generally weak post lunch yesterday, unable to move, slumped to her Right side, still responsive, talking to the family, no LOC/Fall but very weak and hence was brought to ED. Initial Non con CTH w/ no acute intracranial pathology, encephalomalacia and gliosis in R parietal lobe, CTA H/N w/some severe extracranial carotid stenosis 2/2 calcified plaque and C/F moyamoya syndrome given tangle of vessel appearance in LM1 region with symmetrical distal vasculature. Pt deemed not a candidate for any stroke intervention, no TNK 2/2 out of the time window, No EVT 2/2 no LVO in CTA.   Of note, pt had a fall last year, has R foot drop, and had a DVT last year per family. Pt normally ambulates slow but able to ambulate w/walker @ baseline, needs help with all ADLs.   Patient was then transferred to [facility] for NSGY evaluation for concern for moyamoya.  (05 May 2025 15:21)      SUBJECTIVE: Additional seroquel given overnight.  No new neurologic complaints.  ROS negative unless otherwise noted.    acetaminophen     Tablet .. 650 milliGRAM(s) Oral every 6 hours PRN  aspirin enteric coated 81 milliGRAM(s) Oral daily  atorvastatin 40 milliGRAM(s) Oral at bedtime  clopidogrel Tablet 75 milliGRAM(s) Oral daily  famotidine    Tablet 20 milliGRAM(s) Oral daily  gabapentin 300 milliGRAM(s) Oral at bedtime  gabapentin 100 milliGRAM(s) Oral daily  heparin   Injectable 5000 Unit(s) SubCutaneous every 12 hours  lidocaine   4% Patch 1 Patch Transdermal daily PRN  melatonin 3 milliGRAM(s) Oral at bedtime  QUEtiapine 25 milliGRAM(s) Oral at bedtime      PHYSICAL EXAM:   Vital Signs Last 24 Hrs  T(C): 36.5 (07 May 2025 08:49), Max: 36.7 (06 May 2025 21:08)  T(F): 97.7 (07 May 2025 08:49), Max: 98.1 (06 May 2025 21:08)  HR: 61 (07 May 2025 08:49) (56 - 79)  BP: 125/63 (07 May 2025 08:49) (93/50 - 143/82)  BP(mean): --  RR: 18 (07 May 2025 08:49) (18 - 18)  SpO2: 95% (07 May 2025 08:49) (93% - 98%)  Patient On (Oxygen Delivery Method): room air      Welsh Interpretation by daughter at bedside  General: No acute distress  HEENT: EOM intact, visual fields full  Abdomen: Soft, nontender, nondistended   Extremities: No edema    NEUROLOGICAL EXAM:  Mental status: Awake, alert. Intermittently a&o x2. Minimal verbal output. Follows simple commands.   Cranial Nerves: No facial asymmetry, no gaze preference  Motor exam: Increased tone throughout. mild cogwheeling B/L. Moving right sight with some effort antigravity. Moves left side spontaneously antigravity.  Sensation: Intact to light touch   Coordination/ Gait: No dysmetria    LABS:                        13.0   8.66  )-----------( 181      ( 06 May 2025 06:25 )             40.3    05-06    137  |  105  |  48[H]  ----------------------------<  109[H]  4.9   |  17[L]  |  1.25    Ca    9.3      06 May 2025 06:26  Phos  5.2     05-06  Mg     2.2     05-06          IMAGING: Reviewed by me.   MRA Head 5/6:   1. ANTERIOR CIRCULATION:   Subtotal occlusion left internal carotid artery. Left anterior cerebral artery are perfused via right with collateral circulation from the right internal carotid artery. Left middle cerebral artery appears patent but markedly attenuated. Intracranial atherosclerosis right internal carotid artery, mild to moderate.  2. POSTERIOR CIRCULATION:  Intracranial atherosclerosis dominant right vertebral artery, at least moderate, and left posterior cerebral arterial P1 segment, mild-to-moderate    MR Brain 4/30/25:  1. Acute-subacute cortical infarcts LEFT precentral gyrus and LEFT frontoparietal parasagittal region.  2. Additional findings described in detail above.  Addendum: Question of additional subcentimeter focus of recent ischemia right precentral gyrus, however without definitive corresponding ADC or FLAIR abnormality, possibly artifactual. Corroborate with clinical findings.      4/29/25  CTH: No mass effect, acute hemorrhage, or midline shift. There are periventricular and subcortical white matter hypodensities, consistent with microvascular type changes. Encephalomalacia and gliosis right parietal lobe.    CT PERFUSION:  Technical limitations: Motion artifact  Core infarction: 0 ml  Penumbra / tissue at risk for active ischemia: 93 mL    CTA NECK: Severe bilateral calcified plaque.    CTA HEAD: The proximal left M1 segment is not well visualized and there is a tangle of small vessels in this region. Cannot exclude moyamoya syndrome. Distal vasculature appears fairly symmetrical compared to the right. This is likely chronic.

## 2025-05-07 NOTE — PROGRESS NOTE ADULT - SUBJECTIVE AND OBJECTIVE BOX
Subjective: Patient seen and examined. No new events except as noted.   Hypotensive overnight  s/p 500cc bolus     REVIEW OF SYSTEMS:    CONSTITUTIONAL: +weakness, fevers or chills  EYES/ENT: No visual changes;  No vertigo or throat pain   NECK: No pain or stiffness  RESPIRATORY: No cough, wheezing, hemoptysis; No shortness of breath  CARDIOVASCULAR: No chest pain or palpitations  GASTROINTESTINAL: No abdominal or epigastric pain. No nausea, vomiting, or hematemesis; No diarrhea or constipation. No melena or hematochezia.  GENITOURINARY: No dysuria, frequency or hematuria  NEUROLOGICAL: No numbness or weakness  SKIN: No itching, burning, rashes, or lesions   All other review of systems is negative unless indicated above.    MEDICATIONS:  MEDICATIONS  (STANDING):  aspirin enteric coated 81 milliGRAM(s) Oral daily  atorvastatin 40 milliGRAM(s) Oral at bedtime  clopidogrel Tablet 75 milliGRAM(s) Oral daily  famotidine    Tablet 20 milliGRAM(s) Oral daily  gabapentin 300 milliGRAM(s) Oral at bedtime  gabapentin 100 milliGRAM(s) Oral daily  heparin   Injectable 5000 Unit(s) SubCutaneous every 12 hours  melatonin 3 milliGRAM(s) Oral at bedtime  QUEtiapine 25 milliGRAM(s) Oral at bedtime      PHYSICAL EXAM:  T(C): 36.5 (05-07-25 @ 08:49), Max: 36.7 (05-06-25 @ 21:08)  HR: 61 (05-07-25 @ 08:49) (56 - 79)  BP: 125/63 (05-07-25 @ 08:49) (93/50 - 143/82)  RR: 18 (05-07-25 @ 08:49) (18 - 18)  SpO2: 95% (05-07-25 @ 08:49) (93% - 98%)  Wt(kg): --  I&O's Summary    06 May 2025 07:01  -  07 May 2025 07:00  --------------------------------------------------------  IN: 80 mL / OUT: 500 mL / NET: -420 mL          Appearance: Normal	  HEENT:   Normal oral mucosa, PERRL, EOMI	  Lymphatic: No lymphadenopathy  Cardiovascular: Normal S1 S2, No JVD, No murmurs, No edema  Respiratory: Lungs clear to auscultation	  Psychiatry: A & O x 3, Mood & affect appropriate  Gastrointestinal:  Soft, Non-tender, + BS	  Skin: No rashes, No ecchymoses, No cyanosis	  Neurologic Exam:  Mental status - Awake, Alert, Oriented to person, hospital but not month, age. Speech fluent, naming and repetition intact. Follows some simple commands.     Cranial nerves: Right 2mm, Left 1mm reactive, surgical, no gaze pre fence, intact sensation, no apparent facial asymmetry     Motor - Slightly increased tone throughout, mild cogwheeling b/l, 4-/5 in RUE push/pull/, 4+/5 in LUE, 3/5 in RLE, 4+/5 in LLE    Sensation - Light touch intact b/l    DTR's - Deferred 2/2 focused exam    Coordination - Deferred 2/2 mental status    Gait and station - Untested 2/2 patient safety  Extremities: Normal range of motion, No clubbing, cyanosis or edema  Vascular: Peripheral pulses palpable 2+ bilaterally    LABS:    CARDIAC MARKERS:                                13.0   8.66  )-----------( 181      ( 06 May 2025 06:25 )             40.3     05-06    137  |  105  |  48[H]  ----------------------------<  109[H]  4.9   |  17[L]  |  1.25    Ca    9.3      06 May 2025 06:26  Phos  5.2     05-06  Mg     2.2     05-06      proBNP:   Lipid Profile:   HgA1c:   TSH:             TELEMETRY: 	 SR   ECG:  	  RADIOLOGY:   < from: VA Duplex Carotid, Bilat (05.06.25 @ 15:48) >    ACC: [id number] EXAM:  CAROTID DUPLEX BILATERAL   ORDERED BY:  JESSICA PULIDO     PROCEDURE DATE:  05/06/2025          INTERPRETATION:  CLINICAL INFORMATION: CVA    COMPARISON: None available.    TECHNIQUE: Grayscale, color and spectral Doppler examination of both   carotid arteries was performed.    FINDINGS:    Prominent calcified atheromatous plaque impinges significantly, narrowing   the lumen of the right and left internal carotid arteries at the   bifurcations.    Peak systolic velocities are as follows:    RIGHT:  PROX CCA = 59  DIST CCA = 35  PROX ICA = 653/235  MID ICA = 74  DIST ICA = 44  ECA = 172    LEFT:  PROX CCA = 59  DIST CCA = 41  PROX ICA = 315/35  MID ICA = 42  DIST ICA = 46  ECA = 253    Antegrade flow is noted within both vertebral arteries.    IMPRESSION:    There are severe greater than 70% stenoses of the right and the left   internal carotid arteries.    There are flow-limiting stenoses of the right and the left external   carotid arteries.      Dr Makayla Davison notified    Measurement of carotid stenosis is based on updated recommendations for   carotid stenosis interpretation criteria from the Intersocietal   Accreditation Commission (IAC) Vascular Testing Board, modified from the   Society of Radiologists in Ultrasound (SRU) Consensus Conference Criteria   for Internal Carotid Artery Stenosis.        --- End of Report ---    < end of copied text >  < from: MR Angio Head No Cont (05.06.25 @ 13:03) >    ACC: [id number] EXAM:  MR ANGIO BRAIN   ORDERED BY:  EYAD FONTANA     PROCEDURE DATE:  05/06/2025          INTERPRETATION:  MR angiography intracranial circulation without   gadolinium contrast    CLINICAL INFORMATION:    Stroke  MMR  Admitting Dxs: I63.9 CEREBRAL   INFARCTION, UNSPECIFIED  right arm weakness and slurred speech. Hx Dementia      TECHNIQUE:  MR angiography was performed using three-dimensional   time-of-flight technique.  This data set was reconstructed as maximum   intensity pixel images and displayed in multiple rotations.  CONTRAST:    None    COMPARISON:   CT head stroke code and CT angiography 4/29/2025 also MR   brain 4/30/2025    FINDINGS:    The ANTERIOR circulation demonstrates intact inflow from the ascending   cervical segment to the petrous segment of the right internal carotid   artery. The cavernous and clinoid segments of the right internal carotid   artery demonstrate luminal irregularity and attenuation in the clinoid   segment. No critical stenosis results. The right middle cerebral arterial   M1 segment demonstrates low-grade luminal irregularity in its middle   third. It remains patent anterior and posterior divisions. The left   internal carotid artery demonstrates attenuated inflow. There are   multifocal high-grade stenoses in its petrous cavernous and clinoid   segments. Its intracranial vascular distribution is perfused in part via   patent right anterior cerebral arterial A1 segment and anterior   communicating artery, with flow extending into bilateral anterior   cerebral arteries to peripheral branching. The left middle cerebral   artery, however, is only faintly demonstrated.    The POSTERIOR circulation demonstrates intact inflow from each vertebral   artery.   The right vertebral artery is dominant caliber. It demonstrates   focal stenoses occur at least moderate in magnitude. The smaller caliber   left vertebral artery is not well seen.    PICA arteries are not well   seen.  The basilar artery appears intact.  Superior cerebellar arteries   are demonstrated.  Posterior communicating artery is demonstrated on the   left, not clearly seen on the right. The left posterior cerebral arterial   P1 segment demonstrates a severe stenosis. Each posterior cerebral artery   is patent to peripheral branching.    No intracranial aneurysm or arteriovenous malformation  is recognized.    Note that small intracranial aneurysms less than 0.5 cm may not be   detected by this technique.      IMPRESSION:    1. ANTERIOR CIRCULATION:   Subtotal occlusion left internal carotid   artery. Left anterior cerebral artery are perfused via right with   collateral circulation from the right internal carotid artery. Left   middle cerebral artery appears patent but markedly attenuated.   Intracranial atherosclerosis right internal carotid artery, mild to   moderate.    2. POSTERIOR CIRCULATION:  Intracranial atherosclerosis dominant right   vertebral artery, at least moderate, and left posterior cerebral arterial   P1 segment, mild-to-moderate    3. Patient motion limited scan. Requested MR angiography of the carotid   could not be performed.    --- End of Report ---    < end of copied text >    DIAGNOSTIC TESTING:  [ ] Echocardiogram:  [ ]  Catheterization:  [ ] Stress Test:    OTHER:

## 2025-05-07 NOTE — CHART NOTE - NSCHARTNOTEFT_GEN_A_CORE
Patient is a 89y w/ PMHx of dementia, HTN, PD presented to [facility] ED on 4/29 for eval of Right sided weakness. Initial Non con CTH w/ no acute intracranial pathology, encephalomalacia and gliosis in R parietal lobe, CTA H/N w/some severe extracranial carotid stenosis 2/2 calcified plaque and C/F moyamoya syndrome given tangle of vessel appearance in LM1 region with symmetrical distal vasculature. Tx to [facility] under NSGY due to concern for moyamoya.     *Speech Hx: Patient is new to this service. This admission, order received and appreciated for communication assessment. On 5/6, SLP attempted to complete 2x h/e patient off the floor at testing both times. Plan for SLP to f/u.     TODAY, 5/7, chart reviewed. Patient received sleeping in bed; on RA; daughter present and agreeable to serve as Indian  for the duration of this assessment. Daughter sharing she was "very awake this morning" h/e has since been sleeping since breakfast. Daughter does not endorse notable speech or language deficits post-CVA, however does share her memory seems more impaired (dementia at baseline). Daughter agreeable to attempt to rouse patient for the purpose of the communication assessment. Able to minimally rouse via verbal/tactile stimuli, patient briefly opens eyes and answered AAO questions (AAOx1 - self); daughter shares typically AAOx2 (self, place). Patient stating "yes" to completing assessment however subsequently fell back asleep. Patient is not appropriate for communication assessment at this time. This service to f/u at a more appropriate time. Purposeful proactive rounding reinforced and 5 Ps addressed. Patient left in no distress.     d/w daughter at bedside; DAVID Will, CCC-SLP (TEAMS)

## 2025-05-07 NOTE — PROGRESS NOTE ADULT - SUBJECTIVE AND OBJECTIVE BOX
EP Attending  HISTORY OF PRESENT ILLNESS: HPI:   89y Ambidextrous/ L hand dominant  Female with PMHx of  Dementia, Alport syndrome, HTN, HLD, Lumbar compression Fx, PD presented to [facility] ED on 4/29 for eval of Right sided weakness. LKWT 4/28 around 2300 and woke up on Tuesday (4/29) AM was unable to move her R side, appeared lethargic to the family and pt slept most of the morning until noon time. Pt noted to be more lethargic and generally weak post lunch yesterday, unable to move, slumped to her Right side, still responsive, talking to the family, no LOC/Fall but very weak and hence was brought to ED. Initial Non con CTH w/ no acute intracranial pathology, encephalomalacia and gliosis in R parietal lobe, CTA H/N w/some severe extracranial carotid stenosis 2/2 calcified plaque and C/F moyamoya syndrome given tangle of vessel appearance in LM1 region with symmetrical distal vasculature. Pt deemed not a candidate for any stroke intervention, no TNK 2/2 out of the time window, No EVT 2/2 no LVO in CTA.   Of note, pt had a fall last year, has R foot drop, and had a DVT last year per family. Pt normally ambulates slow but able to ambulate w/walker @ baseline, needs help with all ADLs.   Patient was then transferred to [facility] for NSGY evaluation for concern for moyamoya.  (05 May 2025 15:21)    Community-dwelling 88yo, here with family after wake-up stroke.  EP called re: AFib surveillance.  Patient has cognitive impairment/dementia with known limited orientation (A/O x 1-2?).  History of HTN, HLD on medical therapy.  No prior known arrhythmia diagnoses.  Not able to participate in full ROS due to dementia.  Date of service 5/7- resting in bed, sleeping soundly, no family at bedside.    PAST MEDICAL & SURGICAL HISTORY:  Dementia  Alport syndrome  HTN (hypertension)  HLD (hyperlipidemia)  History of scoliosis  Lumbar compression fracture  H/O Parkinson's disease  S/P lumbar spine operation    acetaminophen     Tablet .. 650 milliGRAM(s) Oral every 6 hours PRN  aspirin enteric coated 81 milliGRAM(s) Oral daily  atorvastatin 40 milliGRAM(s) Oral at bedtime  clopidogrel Tablet 75 milliGRAM(s) Oral daily  famotidine    Tablet 20 milliGRAM(s) Oral daily  gabapentin 300 milliGRAM(s) Oral at bedtime  gabapentin 100 milliGRAM(s) Oral daily  heparin   Injectable 5000 Unit(s) SubCutaneous every 12 hours  lidocaine   4% Patch 1 Patch Transdermal daily PRN  melatonin 3 milliGRAM(s) Oral at bedtime  QUEtiapine 25 milliGRAM(s) Oral at bedtime                            13.0   8.66  )-----------( 181      ( 06 May 2025 06:25 )             40.3       05-06    137  |  105  |  48[H]  ----------------------------<  109[H]  4.9   |  17[L]  |  1.25    Ca    9.3      06 May 2025 06:26  Phos  5.2     05-06  Mg     2.2     05-06    T(C): 36.5 (05-07-25 @ 08:49), Max: 36.7 (05-06-25 @ 21:08)  HR: 61 (05-07-25 @ 08:49) (56 - 79)  BP: 125/63 (05-07-25 @ 08:49) (93/50 - 143/82)  RR: 18 (05-07-25 @ 08:49) (18 - 18)  SpO2: 95% (05-07-25 @ 08:49) (93% - 98%)  Wt(kg): --    I&O's Summary    06 May 2025 07:01  -  07 May 2025 07:00  --------------------------------------------------------  IN: 80 mL / OUT: 500 mL / NET: -420 mL    Appearance: Elderly woman in no acute distress	  HEENT:   Normal oral mucosa, PERRL, EOMI	  Lymphatic: No lymphadenopathy , no edema  Cardiovascular: Normal S1 S2, No JVD, No murmurs , Peripheral pulses palpable 2+ bilaterally  Respiratory: Lungs clear to auscultation, normal effort 	  Gastrointestinal:  Soft, Non-tender, + BS	  Skin: No rashes, No ecchymoses, No cyanosis, warm to touch  Musculoskeletal: Normal range of motion, normal strength  Psychiatry:  Mood & affect appropriate      TELEMETRY: NSR, no AFib	    ECG: NSR  Echo:  < from: TTE W or WO Ultrasound Enhancing Agent (04.30.25 @ 14:20) >     CONCLUSIONS:      1. Left ventricular systolic function is normal with an ejection fraction of 66 % by Diehl's method of disks.   2. There is normal LV mass and concentric remodeling.   3. There is mild (grade 1) left ventricular diastolic dysfunction.   4. The right ventricle is not well visualized. probably normal right ventricular cavity size and probably normal right ventricular systolic function.   5. Estimated pulmonary artery systolic pressure is 43 mmHg, consistent with mild pulmonary hypertension.   6. Agitated saline injection was negative for intracardiac shunt.    < end of copied text >    < from: CT Angio Neck Stroke Protocol w/ IV Cont (04.29.25 @ 19:21) >  MPRESSION:    CT PERFUSION:  Technical limitations: Motion artifact    Core infarction: 0 ml  Penumbra / tissue at risk for active ischemia: 93 mL    CTA NECK:  Severe bilateral calcified plaque.    CTA HEAD:  The proximal left M1 segment is not well visualized and there is a tangle   of small vessels in this region. Cannot exclude moyamoya syndrome. Distal   vasculature appears fairly symmetrical compared to the right. This is   likely chronic.    < end of copied text >    < from: MR Head No Cont (04.30.25 @ 12:54) >  1. Acute-subacute cortical infarcts LEFT precentral gyrus and LEFT   frontoparietal parasagittal region.  2. Additional findings described in detail above.    < end of copied text >  < from: MR Head No Cont (04.30.25 @ 12:54) >  Age-appropriate atrophy with moderate confluent and scattered foci of   increased T2 and FLAIR signal periventricular and juxtacortical white   matter bilateral cerebral hemispheres; a nonspecific finding which   statistically reflects chronic microvascular ischemic change. Chronic   right parietal infarct. Approximate 1 cm nodular focus of susceptibility   left corona radiata, compatible with hemosiderin deposition versus   mineralization.    Serpiginous, cortically based foci of restricted diffusion with   associated T2-FLAIR prolongation left precentral gyrus and left   frontoparietal parasagittal region. No obvious hemorrhagic   transformation, however suboptimally evaluated secondary to motion.    < end of copied text >    ASSESSMENT/PLAN: Ms Kwan is a 89y Female brought in from home by family after a wake-up stroke.  Being managed medically / outside of timeframe for interventions.  She has a history of HTN, and hyperlipidemia.  There is CT/MRI evidence of prior stroke.  Her echocardiogram is reassuring, normal EF, no patent foramen ovale.    Head and neck vasculature notable for severe bilateral carotid artery stenosis, and MoyaMoya like blood vessel changes bilaterally.  She is referred for a loop recorder specifically for surveillance for paroxysmal atrial fibrillation after stroke of unknown source.  Currently she is prescribed dual antiplatelet therapy for intracranial/extracranial peripheral arterial disease.  Recommend discharge with ambulatory/wearable cardiac rhythm monitoring, and defer potential of ILR implant to the outpatient setting, when family can be present for the implant to help manage the patient.  For now, continue dual antiplatelet therapy, and statin for secondary prevention of stroke.    Kailash Fong M.D.  Cardiac Electrophysiology    office [phone]  pager [phone]

## 2025-05-07 NOTE — PROGRESS NOTE ADULT - PROBLEM SELECTOR PLAN 1
Acute-subacute cortical infarcts LEFT precentral gyrus and LEFT frontoparietal parasagittal region.  DAPT  Neurosx consulted   TTE wnl  Monitor on tele  Possible ILR placement prior to dc.  Carotid duplex shows severe >70% stenoses of the right and the left internal carotid arteries. Acute-subacute cortical infarcts LEFT precentral gyrus and LEFT frontoparietal parasagittal region.  DAPT  Neurosx consulted   TTE wnl  Monitor on tele  ILR placement outpt as per EPS  Carotid duplex shows severe >70% stenoses of the right and the left internal carotid arteries.

## 2025-05-08 ENCOUNTER — TRANSCRIPTION ENCOUNTER (OUTPATIENT)
Age: 89
End: 2025-05-08

## 2025-05-08 VITALS
TEMPERATURE: 98 F | OXYGEN SATURATION: 98 % | RESPIRATION RATE: 18 BRPM | HEART RATE: 59 BPM | SYSTOLIC BLOOD PRESSURE: 126 MMHG | DIASTOLIC BLOOD PRESSURE: 73 MMHG

## 2025-05-08 RX ORDER — GABAPENTIN 400 MG/1
1 CAPSULE ORAL
Qty: 0 | Refills: 0 | DISCHARGE
Start: 2025-05-08

## 2025-05-08 RX ORDER — ATORVASTATIN CALCIUM 80 MG/1
1 TABLET, FILM COATED ORAL
Qty: 0 | Refills: 0 | DISCHARGE
Start: 2025-05-08

## 2025-05-08 RX ORDER — SPIRONOLACTONE 25 MG
1 TABLET ORAL
Refills: 0 | DISCHARGE

## 2025-05-08 RX ORDER — ASPIRIN 325 MG
1 TABLET ORAL
Qty: 0 | Refills: 0 | DISCHARGE
Start: 2025-05-08

## 2025-05-08 RX ORDER — HEPARIN SODIUM 1000 [USP'U]/ML
5000 INJECTION INTRAVENOUS; SUBCUTANEOUS
Qty: 0 | Refills: 0 | DISCHARGE
Start: 2025-05-08

## 2025-05-08 RX ORDER — CLOPIDOGREL BISULFATE 75 MG/1
1 TABLET, FILM COATED ORAL
Qty: 0 | Refills: 0 | DISCHARGE
Start: 2025-05-08

## 2025-05-08 RX ORDER — QUETIAPINE FUMARATE 25 MG/1
25 TABLET ORAL ONCE
Refills: 0 | Status: COMPLETED | OUTPATIENT
Start: 2025-05-08 | End: 2025-05-08

## 2025-05-08 RX ORDER — LIDOCAINE HYDROCHLORIDE 20 MG/ML
1 JELLY TOPICAL
Qty: 0 | Refills: 0 | DISCHARGE
Start: 2025-05-08

## 2025-05-08 RX ADMIN — CLOPIDOGREL BISULFATE 75 MILLIGRAM(S): 75 TABLET, FILM COATED ORAL at 11:44

## 2025-05-08 RX ADMIN — HEPARIN SODIUM 5000 UNIT(S): 1000 INJECTION INTRAVENOUS; SUBCUTANEOUS at 05:08

## 2025-05-08 RX ADMIN — Medication 81 MILLIGRAM(S): at 11:49

## 2025-05-08 RX ADMIN — GABAPENTIN 100 MILLIGRAM(S): 400 CAPSULE ORAL at 11:45

## 2025-05-08 RX ADMIN — Medication 20 MILLIGRAM(S): at 11:44

## 2025-05-08 RX ADMIN — QUETIAPINE FUMARATE 25 MILLIGRAM(S): 25 TABLET ORAL at 00:26

## 2025-05-08 NOTE — DISCHARGE NOTE PROVIDER - PROVIDER TOKENS
PROVIDER:[TOKEN:[[id number]],FOLLOWUP:[1 month]],PROVIDER:[TOKEN:[[id number]],FOLLOWUP:[1 month]]

## 2025-05-08 NOTE — PROGRESS NOTE ADULT - PROBLEM SELECTOR PLAN 1
Acute-subacute cortical infarcts LEFT precentral gyrus and LEFT frontoparietal parasagittal region.  Neurosx consulted   TTE wnl  Monitor on tele  ILR placement outpt as per EPS  Carotid duplex shows severe >70% stenoses of b/l ICAs  No need for MRA NOVA as will not .  DAPT

## 2025-05-08 NOTE — DISCHARGE NOTE PROVIDER - NSDCMRMEDTOKEN_GEN_ALL_CORE_FT
acetaminophen 325 mg oral tablet: 2 tab(s) orally every 6 hours As needed Temp greater or equal to 38C (100.4F), Mild Pain (1 - 3)  aspirin 81 mg oral delayed release tablet: 1 tab(s) orally once a day  atorvastatin 40 mg oral tablet: 1 tab(s) orally once a day (at bedtime)  clopidogrel 75 mg oral tablet: 1 tab(s) orally once a day  famotidine 20 mg oral tablet: 1 tab(s) orally once a day  Lasix 40 mg oral tablet: 1 tab(s) orally once a day  lidocaine 4% topical film: Apply topically to affected area once a day  melatonin 3 mg oral tablet: 1 tab(s) orally once a day (at bedtime)  QUEtiapine 25 mg oral tablet: 1 tab(s) orally once a day (at bedtime)  risperiDONE 1 mg/mL oral solution: 0.5 milliliter(s) orally 2 times a day As needed agitation  spironolactone 25 mg oral tablet: 1 tab(s) orally once a day  valsartan 80 mg oral tablet: 1 tab(s) orally   acetaminophen 325 mg oral tablet: 2 tab(s) orally every 6 hours As needed Temp greater or equal to 38C (100.4F), Mild Pain (1 - 3)  aspirin 81 mg oral delayed release tablet: 1 tab(s) orally once a day  atorvastatin 40 mg oral tablet: 1 tab(s) orally once a day (at bedtime)  clopidogrel 75 mg oral tablet: 1 tab(s) orally once a day stop after 21 days  famotidine 20 mg oral tablet: 1 tab(s) orally once a day  gabapentin 100 mg oral capsule: 1 cap(s) orally once a day  gabapentin 300 mg oral capsule: 1 cap(s) orally once a day (at bedtime) as needed for  severe pain  heparin: 5,000 unit(s) subcutaneous every 12 hours  Lasix 40 mg oral tablet: 1 tab(s) orally once a day  lidocaine 4% topical film: Apply topically to affected area once a day  melatonin 3 mg oral tablet: 1 tab(s) orally once a day (at bedtime)  QUEtiapine 25 mg oral tablet: 1 tab(s) orally once a day (at bedtime)  valsartan 80 mg oral tablet: 1 tab(s) orally

## 2025-05-08 NOTE — SPEECH LANGUAGE PATHOLOGY EVALUATION - SLP DIAGNOSIS
Order received and chart reviewed. Attempted to examine, however upon encounter patient currently working with PT. This service to re-attempt at a more appropriate time.
Patient is a 89y w/ PMHx of dementia, HTN, PD presented Right sided weakness; transferred to [facility] c/f moyamoya syndrome. Patient now presents with expressive-receptive language skills that are WNL given baseline dementia dx. Episodes of ?slurred speech and low vocal volume per family, however largely suspect i/s/o lethargy/reduced effort. Family at bedside reports patient is mostly at baseline mental status, with the exception of worsened memory. Patient is able to follow all 1-step commands and able to participate in majority of assessment, assessment somewhat c/b lethargy. SLP services are warranted at next level of care. This service will continue to follow.

## 2025-05-08 NOTE — PROGRESS NOTE ADULT - SUBJECTIVE AND OBJECTIVE BOX
Subjective: Patient seen and examined. No new events except as noted.   feels ok  daughters at bedside    REVIEW OF SYSTEMS:    CONSTITUTIONAL: +weakness, fevers or chills  EYES/ENT: No visual changes;  No vertigo or throat pain   NECK: No pain or stiffness  RESPIRATORY: No cough, wheezing, hemoptysis; No shortness of breath  CARDIOVASCULAR: No chest pain or palpitations  GASTROINTESTINAL: No abdominal or epigastric pain. No nausea, vomiting, or hematemesis; No diarrhea or constipation. No melena or hematochezia.  GENITOURINARY: No dysuria, frequency or hematuria  NEUROLOGICAL: No numbness or weakness  SKIN: No itching, burning, rashes, or lesions   All other review of systems is negative unless indicated above.    MEDICATIONS:  MEDICATIONS  (STANDING):  aspirin enteric coated 81 milliGRAM(s) Oral daily  atorvastatin 40 milliGRAM(s) Oral at bedtime  clopidogrel Tablet 75 milliGRAM(s) Oral daily  famotidine    Tablet 20 milliGRAM(s) Oral daily  gabapentin 300 milliGRAM(s) Oral at bedtime  gabapentin 100 milliGRAM(s) Oral daily  heparin   Injectable 5000 Unit(s) SubCutaneous every 12 hours  melatonin 3 milliGRAM(s) Oral at bedtime  QUEtiapine 25 milliGRAM(s) Oral at bedtime      PHYSICAL EXAM:  T(C): 36.4 (05-08-25 @ 09:08), Max: 36.8 (05-08-25 @ 00:10)  HR: 86 (05-08-25 @ 09:08) (53 - 86)  BP: 139/80 (05-08-25 @ 09:08) (118/58 - 144/76)  RR: 18 (05-08-25 @ 09:08) (18 - 18)  SpO2: 95% (05-08-25 @ 09:08) (95% - 98%)  Wt(kg): --  I&O's Summary    07 May 2025 07:01  -  08 May 2025 07:00  --------------------------------------------------------  IN: 1075 mL / OUT: 400 mL / NET: 675 mL          Appearance: Normal	  HEENT:   Normal oral mucosa, PERRL, EOMI	  Lymphatic: No lymphadenopathy  Cardiovascular: Normal S1 S2, No JVD, No murmurs, No edema  Respiratory: Lungs clear to auscultation	  Psychiatry: A & O x 3, Mood & affect appropriate  Gastrointestinal:  Soft, Non-tender, + BS	  Skin: No rashes, No ecchymoses, No cyanosis	  Neurologic Exam:  Mental status - Awake, Alert, Oriented to person, hospital but not month, age. Speech fluent, naming and repetition intact. Follows some simple commands.     Cranial nerves: Right 2mm, Left 1mm reactive, surgical, no gaze pre fence, intact sensation, no apparent facial asymmetry     Motor - Slightly increased tone throughout, mild cogwheeling b/l, 4-/5 in RUE push/pull/, 4+/5 in LUE, 3/5 in RLE, 4+/5 in LLE    Sensation - Light touch intact b/l    DTR's - Deferred 2/2 focused exam    Coordination - Deferred 2/2 mental status    Gait and station - Untested 2/2 patient safety  Extremities: Normal range of motion, No clubbing, cyanosis or edema  Vascular: Peripheral pulses palpable 2+ bilaterally        LABS:    CARDIAC MARKERS:                  proBNP:   Lipid Profile:   HgA1c:   TSH:             TELEMETRY: SR	    ECG:  	  RADIOLOGY:   DIAGNOSTIC TESTING:  [ ] Echocardiogram:  [ ]  Catheterization:  [ ] Stress Test:    OTHER:

## 2025-05-08 NOTE — PROGRESS NOTE ADULT - TIME BILLING
I spent 30 minutes in preparation to see the patient, including reviewing the brain imaging in past one year, obtaining and/or reviewing the resident/ or PA note, separately obtained history, performing a medically appropriate examination and/or evaluation as documented in this encounter note, counseling and educating of the patient, ordering tests, documenting clinical information in patients electronic record , interpreting results (not separately reported) and communicating results to the patient.   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
I spent 35 minutes in preparation to see the patient, including reviewing the brain imaging in past one year, obtaining and/or reviewing the resident/ or PA note, separately obtained history, performing a medically appropriate examination and/or evaluation as documented in this encounter note, counseling and educating of the patient, ordering tests, documenting clinical information in patients electronic record , interpreting results (not separately reported) and communicating results to the patient.   Evaluation was performed on 5/7/25    L-MCA stroke likely embolic  PLAN  ILR  DAPTx 21 days, THEN ASA   PT/OT

## 2025-05-08 NOTE — SPEECH LANGUAGE PATHOLOGY EVALUATION - SLP GENERAL OBSERVATIONS
Patient received upright in bed; alert; on RA; two daughters present and agreeable to serve as Urdu  throughout. Patient fairly lethargic, rousable with consistent cueing and able to participate in most of assessment. +low vocal volume, suspect due to reduced effort.

## 2025-05-08 NOTE — DISCHARGE NOTE PROVIDER - HOSPITAL COURSE
89y Ambidextrous/ L hand dominant  Female with PMHx of  Dementia, Alport syndrome, HTN, HLD, Lumbar compression Fx, PD presented to [facility] ED on 4/29 for eval of Right sided weakness. LKWT 4/28 around 2300 and woke up on Tuesday (4/29) AM was unable to move her R side, appeared lethargic to the family and pt slept most of the morning until noon time. Pt noted to be more lethargic and generally weak post lunch yesterday, unable to move, slumped to her Right side, still responsive, talking to the family, no LOC/Fall but very weak and hence was brought to ED. Initial Non con CTH w/ no acute intracranial pathology, encephalomalacia and gliosis in R parietal lobe, CTA H/N w/some severe extracranial carotid stenosis 2/2 calcified plaque and C/F moyamoya syndrome given tangle of vessel appearance in LM1 region with symmetrical distal vasculature. Pt deemed not a candidate for any stroke intervention, no TNK 2/2 out of the time window, No EVT 2/2 no LVO in CTA.   Of note, pt had a fall last year, has R foot drop, and had a DVT last year per family. Pt normally ambulates slow but able to ambulate w/walker @ baseline, needs help with all ADLs. Patient was then transferred to [facility] for NSGY evaluation for concern for moyamoya.    Imaging:  MRA Head 5/6:   1. ANTERIOR CIRCULATION:   Subtotal occlusion left internal carotid artery. Left anterior cerebral artery are perfused via right with collateral circulation from the right internal carotid artery. Left middle cerebral artery appears patent but markedly attenuated. Intracranial atherosclerosis right internal carotid artery, mild to moderate.  2. POSTERIOR CIRCULATION:  Intracranial atherosclerosis dominant right vertebral artery, at least moderate, and left posterior cerebral arterial P1 segment, mild-to-moderate    MR Brain 4/30/25:  1. Acute-subacute cortical infarcts LEFT precentral gyrus and LEFT frontoparietal parasagittal region.  2. Additional findings described in detail above.  Addendum: Question of additional subcentimeter focus of recent ischemia right precentral gyrus, however without definitive corresponding ADC or FLAIR abnormality, possibly artifactual. Corroborate with clinical findings.    4/29/25  CTH: No mass effect, acute hemorrhage, or midline shift. There are periventricular and subcortical white matter hypodensities, consistent with microvascular type changes. Encephalomalacia and gliosis right parietal lobe.    CT PERFUSION:  Technical limitations: Motion artifact  Core infarction: 0 ml  Penumbra / tissue at risk for active ischemia: 93 mL    CTA NECK: Severe bilateral calcified plaque.    CTA HEAD: The proximal left M1 segment is not well visualized and there is a tangle of small vessels in this region. Cannot exclude moyamoya syndrome. Distal vasculature appears fairly symmetrical compared to the right. This is likely chronic.      Impression: R hemiparesis due to scattered embolic infarcts (L precentral gyrus, L frontoparietal parasagittal region, R precentral gyrus) i/s/o severe stenosis of B/L ICA etiology ICAD vs ESUS.   ANTITHROMBOTIC THERAPY: Aspirin 81mg + Plavix 75mg daily for 21 days, followed by ASA monotherapy per chance protocol  TTE: EF 66% normal LA. Will need outpatient holter monitor to rule out underlying arrythmia  Carotid Duplex- severe >70% stenosis of B/L ICA. No need for MRA NOVA as will not .    Evaluated by PT/OT and was recommended [facility]. Patient stable for discharge.

## 2025-05-08 NOTE — PROGRESS NOTE ADULT - SUBJECTIVE AND OBJECTIVE BOX
EP Attending  HISTORY OF PRESENT ILLNESS: HPI:   89y Ambidextrous/ L hand dominant  Female with PMHx of  Dementia, Alport syndrome, HTN, HLD, Lumbar compression Fx, PD presented to [facility] ED on 4/29 for eval of Right sided weakness. LKWT 4/28 around 2300 and woke up on Tuesday (4/29) AM was unable to move her R side, appeared lethargic to the family and pt slept most of the morning until noon time. Pt noted to be more lethargic and generally weak post lunch yesterday, unable to move, slumped to her Right side, still responsive, talking to the family, no LOC/Fall but very weak and hence was brought to ED. Initial Non con CTH w/ no acute intracranial pathology, encephalomalacia and gliosis in R parietal lobe, CTA H/N w/some severe extracranial carotid stenosis 2/2 calcified plaque and C/F moyamoya syndrome given tangle of vessel appearance in LM1 region with symmetrical distal vasculature. Pt deemed not a candidate for any stroke intervention, no TNK 2/2 out of the time window, No EVT 2/2 no LVO in CTA.   Of note, pt had a fall last year, has R foot drop, and had a DVT last year per family. Pt normally ambulates slow but able to ambulate w/walker @ baseline, needs help with all ADLs.   Patient was then transferred to [facility] for NSGY evaluation for concern for moyamoya.  (05 May 2025 15:21)    Community-dwelling 88yo, here with family after wake-up stroke.  EP called re: AFib surveillance.  Patient has cognitive impairment/dementia with known limited orientation (A/O x 1-2?).  History of HTN, HLD on medical therapy.  No prior known arrhythmia diagnoses.  Not able to participate in full ROS due to dementia.  5/7- resting in bed, sleeping soundly, no family at bedside.  Date of service 5/8- resting in bed, no overnight issues.    PAST MEDICAL & SURGICAL HISTORY:  Dementia  Alport syndrome  HTN (hypertension)  HLD (hyperlipidemia)  History of scoliosis  Lumbar compression fracture  H/O Parkinson's disease  S/P lumbar spine operation    acetaminophen     Tablet .. 650 milliGRAM(s) Oral every 6 hours PRN  aspirin enteric coated 81 milliGRAM(s) Oral daily  atorvastatin 40 milliGRAM(s) Oral at bedtime  clopidogrel Tablet 75 milliGRAM(s) Oral daily  famotidine    Tablet 20 milliGRAM(s) Oral daily  gabapentin 300 milliGRAM(s) Oral at bedtime  gabapentin 100 milliGRAM(s) Oral daily  heparin   Injectable 5000 Unit(s) SubCutaneous every 12 hours  lidocaine   4% Patch 1 Patch Transdermal daily PRN  melatonin 3 milliGRAM(s) Oral at bedtime  QUEtiapine 25 milliGRAM(s) Oral at bedtime    T(C): 36.6 (05-08-25 @ 13:00), Max: 36.8 (05-08-25 @ 00:10)  HR: 59 (05-08-25 @ 13:00) (53 - 86)  BP: 126/73 (05-08-25 @ 13:00) (118/58 - 144/76)  RR: 18 (05-08-25 @ 13:00) (18 - 18)  SpO2: 98% (05-08-25 @ 13:00) (95% - 98%)  Wt(kg): --    I&O's Summary    07 May 2025 07:01  -  08 May 2025 07:00  --------------------------------------------------------  IN: 1075 mL / OUT: 400 mL / NET: 675 mL    Appearance: Elderly woman in no acute distress	  HEENT:   Normal oral mucosa, PERRL, EOMI	  Lymphatic: No lymphadenopathy , no edema  Cardiovascular: Normal S1 S2, No JVD, No murmurs , Peripheral pulses palpable 2+ bilaterally  Respiratory: Lungs clear to auscultation, normal effort 	  Gastrointestinal:  Soft, Non-tender, + BS	  Skin: No rashes, No ecchymoses, No cyanosis, warm to touch  Musculoskeletal: Normal range of motion, normal strength  Psychiatry:  Mood & affect appropriate    TELEMETRY: NSR, no AFib	    ECG: NSR  Echo:  < from: TTE W or WO Ultrasound Enhancing Agent (04.30.25 @ 14:20) >     CONCLUSIONS:      1. Left ventricular systolic function is normal with an ejection fraction of 66 % by Diehl's method of disks.   2. There is normal LV mass and concentric remodeling.   3. There is mild (grade 1) left ventricular diastolic dysfunction.   4. The right ventricle is not well visualized. probably normal right ventricular cavity size and probably normal right ventricular systolic function.   5. Estimated pulmonary artery systolic pressure is 43 mmHg, consistent with mild pulmonary hypertension.   6. Agitated saline injection was negative for intracardiac shunt.    < end of copied text >    < from: CT Angio Neck Stroke Protocol w/ IV Cont (04.29.25 @ 19:21) >  MPRESSION:    CT PERFUSION:  Technical limitations: Motion artifact    Core infarction: 0 ml  Penumbra / tissue at risk for active ischemia: 93 mL    CTA NECK:  Severe bilateral calcified plaque.    CTA HEAD:  The proximal left M1 segment is not well visualized and there is a tangle   of small vessels in this region. Cannot exclude moyamoya syndrome. Distal   vasculature appears fairly symmetrical compared to the right. This is   likely chronic.    < end of copied text >    < from: MR Head No Cont (04.30.25 @ 12:54) >  1. Acute-subacute cortical infarcts LEFT precentral gyrus and LEFT   frontoparietal parasagittal region.  2. Additional findings described in detail above.    < end of copied text >  < from: MR Head No Cont (04.30.25 @ 12:54) >  Age-appropriate atrophy with moderate confluent and scattered foci of   increased T2 and FLAIR signal periventricular and juxtacortical white   matter bilateral cerebral hemispheres; a nonspecific finding which   statistically reflects chronic microvascular ischemic change. Chronic   right parietal infarct. Approximate 1 cm nodular focus of susceptibility   left corona radiata, compatible with hemosiderin deposition versus   mineralization.    Serpiginous, cortically based foci of restricted diffusion with   associated T2-FLAIR prolongation left precentral gyrus and left   frontoparietal parasagittal region. No obvious hemorrhagic   transformation, however suboptimally evaluated secondary to motion.    < end of copied text >    ASSESSMENT/PLAN: Ms Kwan is a 89y Female brought in from home by family after a wake-up stroke.  Being managed medically / outside of timeframe for interventions.  She has a history of HTN, and hyperlipidemia.  There is CT/MRI evidence of prior stroke.  Her echocardiogram is reassuring, normal EF, no patent foramen ovale.    Head and neck vasculature notable for severe bilateral carotid artery stenosis, and MoyaMoya like blood vessel changes bilaterally.  She is referred for a loop recorder specifically for surveillance for paroxysmal atrial fibrillation after stroke of unknown source.  Currently she is prescribed dual antiplatelet therapy for intracranial/extracranial peripheral arterial disease.  Recommend discharge with ambulatory/wearable cardiac rhythm monitoring, and defer potential of ILR implant to the outpatient setting, when family can be present for the implant to help manage the patient.  For now, continue dual antiplatelet therapy, and statin for secondary prevention of stroke.    Kailash Fong M.D.  Cardiac Electrophysiology    office [phone]  pager [phone]

## 2025-05-08 NOTE — DISCHARGE NOTE PROVIDER - CARE PROVIDER_API CALL
Meghana Hemphill  NP in Family Health  [address], [address]  [place] [address]  Phone: [phone]  Fax: [phone]  Follow Up Time: 1 month    Ck Rose  [address], [address]  [place] [address]  Phone: [phone]  Fax: [phone]  Follow Up Time: 1 month

## 2025-05-08 NOTE — DISCHARGE NOTE PROVIDER - NSDCCPCAREPLAN_GEN_ALL_CORE_FT
PRINCIPAL DISCHARGE DIAGNOSIS  Diagnosis: Stroke  Assessment and Plan of Treatment: Please follow up with neurologist after being discharged from rehab. The office will call you to schedule an appointment, if you do not hear from them please call [phone]. Continue taking medications as prescribed. If you were prescribed a statin for your cholesterol please make sure you have your liver enzymes checked with your primary care physician. Monitor your blood pressure. Reduce fat, cholesterol and salt in your diet. Increase intake of fruits and vegetables. Limit alcohol to minimum and do not smoke. You may be at risk for falling, make changes to your home to help you walk easier. Keep up to date on vaccinations.  If you experience any symptoms of facial drooping, slurred speech, arm or leg weakness, severe headache, vision changes or any worsening symptoms, notify provider immediately and return to ER.       PRINCIPAL DISCHARGE DIAGNOSIS  Diagnosis: Stroke  Assessment and Plan of Treatment: Please follow up with neurologist after being discharged from rehab. The office will call you to schedule an appointment, if you do not hear from them please call [phone]. Continue taking medications as prescribed. If you were prescribed a statin for your cholesterol please make sure you have your liver enzymes checked with your primary care physician. Monitor your blood pressure. Reduce fat, cholesterol and salt in your diet. Increase intake of fruits and vegetables. Limit alcohol to minimum and do not smoke. You may be at risk for falling, make changes to your home to help you walk easier. Keep up to date on vaccinations.  If you experience any symptoms of facial drooping, slurred speech, arm or leg weakness, severe headache, vision changes or any worsening symptoms, notify provider immediately and return to   ER.  ASA and Plavix for 3 weeks followed by ASA monotherapy for stroke prevention

## 2025-05-08 NOTE — SPEECH LANGUAGE PATHOLOGY EVALUATION - H & P REVIEW
89y Ambidextrous/ L hand dominant female with PMHx of dementia, Alport syndrome, HTN, HLD, Lumbar compression Fx, PD presented to [facility] ED on 4/29 for eval of Right sided weakness. LKWT 4/28 around 2300 and woke up on Tuesday (4/29) AM was unable to move her R side, appeared lethargic to the family and pt slept most of the morning until noon time. Initial Non con CTH w/ no acute intracranial pathology, encephalomalacia and gliosis in R parietal lobe, CTA H/N w/some severe extracranial carotid stenosis 2/2 calcified plaque and C/F moyamoya syndrome given tangle of vessel appearance in LM1 region with symmetrical distal vasculature. Pt deemed not a candidate for any stroke intervention, no TNK 2/2 out of the time window, No EVT 2/2 no LVO in CTA. Of note, pt had a fall last year, has R foot drop, and had a DVT last year per family. Pt normally ambulates slow but able to ambulate w/walker @ baseline, needs help with all ADLs./yes
89y Ambidextrous/ L hand dominant female with PMHx of dementia, Alport syndrome, HTN, HLD, Lumbar compression Fx, PD presented to [facility] ED on 4/29 for eval of Right sided weakness. LKWT 4/28 around 2300 and woke up on Tuesday (4/29) AM was unable to move her R side, appeared lethargic to the family and pt slept most of the morning until noon time. Initial Non con CTH w/ no acute intracranial pathology, encephalomalacia and gliosis in R parietal lobe, CTA H/N w/some severe extracranial carotid stenosis 2/2 calcified plaque and C/F moyamoya syndrome given tangle of vessel appearance in LM1 region with symmetrical distal vasculature. Pt deemed not a candidate for any stroke intervention, no TNK 2/2 out of the time window, No EVT 2/2 no LVO in CTA. Of note, pt had a fall last year, has R foot drop, and had a DVT last year per family. Pt normally ambulates slow but able to ambulate w/walker @ baseline, needs help with all ADLs./yes

## 2025-05-08 NOTE — PROGRESS NOTE ADULT - ASSESSMENT
89y Ambidextrous/ L hand dominant  Female with PMHx of  Dementia, Alport syndrome, HTN, HLD, Lumbar compression Fx, PD presented to [facility] ED on 4/29 for eval of Right sided weakness. LKWT 4/28 around 2300 and woke up on Tuesday (4/29) AM was unable to move her R side, appeared lethargic to the family and pt slept most of the morning until noon time. Pt noted to be more lethargic and generally weak post lunch yesterday, unable to move, slumped to her Right side, still responsive, talking to the family, no LOC/Fall but very weak and hence was brought to ED. Initial Non con CTH w/ no acute intracranial pathology, encephalomalacia and gliosis in R parietal lobe, CTA H/N w/some severe extracranial carotid stenosis 2/2 calcified plaque and C/F moyamoya syndrome given tangle of vessel appearance in LM1 region with symmetrical distal vasculature. Pt deemed not a candidate for any stroke intervention, no TNK 2/2 out of the time window, No EVT 2/2 no LVO in CTA.   Of note, pt had a fall last year, has R foot drop, and had a DVT last year per family. Pt normally ambulates slow but able to ambulate w/walker @ baseline, needs help with all ADLs.   Patient was then transferred to [facility] for NSGY evaluation for concern for moyamoya.

## 2025-05-08 NOTE — PROGRESS NOTE ADULT - ASSESSMENT
89y Ambidextrous/ L hand dominant  Female with PMHx of  Dementia, Alport syndrome, HTN, HLD, Lumbar compression Fx, PD presented to [facility] ED on 4/29 for eval of Right sided weakness. LKWT 4/28 around 2300 and woke up on Tuesday (4/29) AM was unable to move her R side, appeared lethargic to the family and pt slept most of the morning until noon time. Initial Non con CTH w/ no acute intracranial pathology, encephalomalacia and gliosis in R parietal lobe, CTA H/N w/some severe extracranial carotid stenosis 2/2 calcified plaque and C/F moyamoya syndrome given tangle of vessel appearance in LM1 region with symmetrical distal vasculature. Pt deemed not a candidate for any stroke intervention, no TNK 2/2 out of the time window, No EVT 2/2 no LVO in CTA.     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 Continue close monitoring for neurologic deterioration. Keep permissive HTN to gradual normotension. LDL 61 - continue home statin as LDL is below goal. MRI Brain w/o, MRA Head w/o, results as above. Carotid Duplex- severe >70% stenosis of B/L ICA. No need for MRA NOVA as will not . Physical therapy/Occupational therapy recommend [facility].     ANTITHROMBOTIC THERAPY: Aspirin 81mg + Plavix 75mg daily for 21 days, followed by ASA monotherapy per chance protocol    PULMONARY: Protecting airway, saturating well     CARDIOVASCULAR: TTE: EF 66% normal LA. Continue cardiac monitoring, no events. Cardiology recs appreciated. Recommend ILR or external cardiac monitoring outpatient to screen for arrhythmia.       SBP goal: < 140    GASTROINTESTINAL:  dysphagia screen passed, tolerating diet       Diet: regular    RENAL: BUN/Cr stable, good urine output      Na Goal: Greater than 135     Almaraz: no    HEMATOLOGY: H/H stable, Platelets 181. BLE dopplers 5/1: no acute dvt.     DVT ppx: Heparin s.c [x] LMWH []     ID: afebrile, no leukocytosis     OTHER: Case and plan discussed with daughter at bedside, all questions were addressed.    DISPOSITION: [facility] per PT/OT eval once bed available      CORE MEASURES:        Admission NIHSS: 14     TPA: [] YES [x] NO      LDL/HDL: 61/45     Depression Screen: pass     Statin Therapy:     Dysphagia Screen: [x] PASS [] FAIL     Smoking [] YES [x] NO      Afib [] YES [x] NO     Stroke Education [x] YES [] NO    Obtain screening lower extremity venous ultrasound in patients who meet 1 or more of the following criteria as patient is high risk for DVT/PE on admission:   [] History of DVT/PE  []Hypercoagulable states (Factor V Leiden, Cancer, OCP, etc. )  []Prolonged immobility (hemiplegia/hemiparesis/post operative or any other extended immobilization)  [] Transferred from outside facility (Rehab or Long term care)  [] Age </= to 50

## 2025-05-08 NOTE — SPEECH LANGUAGE PATHOLOGY EVALUATION - COMMENTS
the patient will improve cognitive skills in order to enhance functional communication of wants/needs.      d/w patient; two daughters at bedside cognitive-linguistic deficits noted, however family reports largely at baseline with the exception of worsened memory s/p CVA. complete cognitive assessment limited i/s/o lethargy did not assess due to R sided weakness and R handed did not assess - family reports patient has not read "in many, many years" continued: Patient was then transferred to [facility] for NSGY evaluation for concern for moyamoya. 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 Also noted to have incident right precentral gyrus, acute/subacute infarct, Mechanism possible cardioembolic in setting of multi-territory infarcts. On tele, likely will need ILR inpatient or outpatient. NSGY -> MRA w/ NOVA; Further plan pending MRI but likely trial of DAPT. Pending d/c to [facility].     IMAGING: CT PERFUSION: Technical limitations: Motion artifact. Core infarction: 0 ml  Penumbra / tissue at risk for active ischemia: 93 mL. CTA NECK: Severe bilateral calcified plaque. CTA HEAD: The proximal left M1 segment is not well visualized and there is a tangle of small vessels in this region. Cannot exclude moyamoya syndrome. Distal vasculature appears fairly symmetrical compared to the right. This is likely chronic. MR Head 4/30: Limited by motion. 1. Acute-subacute cortical infarcts LEFT precentral gyrus and LEFT frontoparietal parasagittal region. CXR: Left lower lobe infiltrate/atelectasis.    *New to this service.* THIS ADMISSION: per d/w daughter yesterday - does not endorse notable speech or language deficits post-CVA, however does share her memory seems more impaired (dementia at baseline). continued: Patient was then transferred to [facility] for NSGY evaluation for concern for moyamoya. 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 Also noted to have incident right precentral gyrus, acute/subacute infarct, Mechanism possible cardioembolic in setting of multi-territory infarcts. On tele, likely will need ILR inpatient or outpatient. NSGY -> MRA w/ NOVA; Further plan pending MRI but likely trial of DAPT. Pending d/c to [facility].     IMAGING: CT PERFUSION: Technical limitations: Motion artifact. Core infarction: 0 ml  Penumbra / tissue at risk for active ischemia: 93 mL. CTA NECK: Severe bilateral calcified plaque. CTA HEAD: The proximal left M1 segment is not well visualized and there is a tangle of small vessels in this region. Cannot exclude moyamoya syndrome. Distal vasculature appears fairly symmetrical compared to the right. This is likely chronic. MR Head 4/30: Limited by motion. 1. Acute-subacute cortical infarcts LEFT precentral gyrus and LEFT frontoparietal parasagittal region. CXR: Left lower lobe infiltrate/atelectasis.    *New to this service.* THIS ADMISSION: per d/w daughter yesterday - does not endorse notable speech or language deficits post-CVA, however does share her memory seems more impaired (dementia at baseline). TODAY - other daughter at bedside reporting similar baseline, w/ c/f ?slurred speech and reduced vocal volume. Daughter noting "chewing is taking her longer" but has improved since being in the hospital. SLP offered swallow evaluation to formally assess, daughter understanding but politely declining.

## 2025-05-08 NOTE — PROGRESS NOTE ADULT - REASON FOR ADMISSION
Left precentral gyrus stroke

## 2025-06-02 PROCEDURE — 80048 BASIC METABOLIC PNL TOTAL CA: CPT

## 2025-06-02 PROCEDURE — 82550 ASSAY OF CK (CPK): CPT

## 2025-06-02 PROCEDURE — 36415 COLL VENOUS BLD VENIPUNCTURE: CPT

## 2025-06-02 PROCEDURE — 70551 MRI BRAIN STEM W/O DYE: CPT | Mod: MC

## 2025-06-02 PROCEDURE — 84484 ASSAY OF TROPONIN QUANT: CPT

## 2025-06-02 PROCEDURE — 85610 PROTHROMBIN TIME: CPT

## 2025-06-02 PROCEDURE — 97530 THERAPEUTIC ACTIVITIES: CPT

## 2025-06-02 PROCEDURE — 97162 PT EVAL MOD COMPLEX 30 MIN: CPT

## 2025-06-02 PROCEDURE — 93306 TTE W/DOPPLER COMPLETE: CPT

## 2025-06-02 PROCEDURE — 87040 BLOOD CULTURE FOR BACTERIA: CPT

## 2025-06-02 PROCEDURE — 80061 LIPID PANEL: CPT

## 2025-06-02 PROCEDURE — 84100 ASSAY OF PHOSPHORUS: CPT

## 2025-06-02 PROCEDURE — 82962 GLUCOSE BLOOD TEST: CPT

## 2025-06-02 PROCEDURE — 70544 MR ANGIOGRAPHY HEAD W/O DYE: CPT | Mod: MC

## 2025-06-02 PROCEDURE — 71045 X-RAY EXAM CHEST 1 VIEW: CPT

## 2025-06-02 PROCEDURE — 70498 CT ANGIOGRAPHY NECK: CPT | Mod: MC

## 2025-06-02 PROCEDURE — 93005 ELECTROCARDIOGRAM TRACING: CPT

## 2025-06-02 PROCEDURE — 93970 EXTREMITY STUDY: CPT

## 2025-06-02 PROCEDURE — 0042T: CPT | Mod: MC

## 2025-06-02 PROCEDURE — 85027 COMPLETE CBC AUTOMATED: CPT

## 2025-06-02 PROCEDURE — 99285 EMERGENCY DEPT VISIT HI MDM: CPT | Mod: 25

## 2025-06-02 PROCEDURE — 85025 COMPLETE CBC W/AUTO DIFF WBC: CPT

## 2025-06-02 PROCEDURE — 70496 CT ANGIOGRAPHY HEAD: CPT | Mod: MC

## 2025-06-02 PROCEDURE — 80053 COMPREHEN METABOLIC PANEL: CPT

## 2025-06-02 PROCEDURE — 83036 HEMOGLOBIN GLYCOSYLATED A1C: CPT

## 2025-06-02 PROCEDURE — 92523 SPEECH SOUND LANG COMPREHEN: CPT

## 2025-06-02 PROCEDURE — 97166 OT EVAL MOD COMPLEX 45 MIN: CPT

## 2025-06-02 PROCEDURE — 83735 ASSAY OF MAGNESIUM: CPT

## 2025-06-02 PROCEDURE — 97110 THERAPEUTIC EXERCISES: CPT

## 2025-06-02 PROCEDURE — 81001 URINALYSIS AUTO W/SCOPE: CPT

## 2025-06-02 PROCEDURE — 83605 ASSAY OF LACTIC ACID: CPT

## 2025-06-02 PROCEDURE — 83880 ASSAY OF NATRIURETIC PEPTIDE: CPT

## 2025-06-02 PROCEDURE — 73562 X-RAY EXAM OF KNEE 3: CPT

## 2025-06-02 PROCEDURE — 93880 EXTRACRANIAL BILAT STUDY: CPT

## 2025-06-02 PROCEDURE — 85730 THROMBOPLASTIN TIME PARTIAL: CPT

## 2025-06-02 PROCEDURE — 70450 CT HEAD/BRAIN W/O DYE: CPT | Mod: MC
